# Patient Record
Sex: MALE | Race: OTHER
[De-identification: names, ages, dates, MRNs, and addresses within clinical notes are randomized per-mention and may not be internally consistent; named-entity substitution may affect disease eponyms.]

---

## 2017-08-29 ENCOUNTER — HOSPITAL ENCOUNTER (OUTPATIENT)
Dept: HOSPITAL 53 - M PAIN | Age: 67
End: 2017-08-29
Attending: NURSE PRACTITIONER
Payer: MEDICARE

## 2017-08-29 DIAGNOSIS — Z87.891: ICD-10-CM

## 2017-08-29 DIAGNOSIS — I10: ICD-10-CM

## 2017-08-29 DIAGNOSIS — E11.9: ICD-10-CM

## 2017-08-29 DIAGNOSIS — M96.1: ICD-10-CM

## 2017-08-29 DIAGNOSIS — Z79.84: ICD-10-CM

## 2017-08-29 DIAGNOSIS — I25.2: ICD-10-CM

## 2017-08-29 DIAGNOSIS — Z95.5: ICD-10-CM

## 2017-08-29 DIAGNOSIS — Z88.8: ICD-10-CM

## 2017-08-29 DIAGNOSIS — Z79.899: ICD-10-CM

## 2017-08-29 DIAGNOSIS — M51.26: Primary | ICD-10-CM

## 2017-08-29 DIAGNOSIS — E03.9: ICD-10-CM

## 2017-09-12 NOTE — ECWPNPC
PATIENT NAME: DAMIR GRACE

: 1950

GENDER: MALE

MRN: [id number]

VISIT DATE: 2017

DISCHARGE DATE: 17 1058

VISIT LOCKED DATE TIME: 

PHYSICIAN: ANISH DAVISON 

RESOURCE: ANISH DAVISON 

 

           

           

REASON FOR APPOINTMENT

           

          1. BACK

           

HISTORY OF PRESENT ILLNESS

           

      NEW PATIENT CONSULT:

      WHEN DID YOUR PAIN FIRST START?

          _____.

           

      BRIEFLY DESCRIBE HOW YOUR PAIN STARTED?

          ______.

           

      HOW DOES YOUR PAIN CHANGE WITH TIME?

          _______.

           

      DOES YOUR PAIN AWAKEN YOU FROM SLEEP?

          _____.

           

      HOW MANY HOURS OF SLEEP DO YOU NORMALLY GET?

          ______.

           

      ANY DIAGNOSTIC TESTING?

          _____.

           

      FACILITY WHERE TESTS WERE DONE?

          ____.

           

      PAIN TREATMENT

           

           

          TREATMENT YES

           

      CANCER

           

           

          HAVE YOU EVER HAD ANY TYPE OF CANCER?NO

           

           

          NO.

           

      PAIN SCREENING:

      PATIENT HAS A COMPLAINT OF ACUTE OR CHRONIC PAIN

           

           

          :YES

           

      FALL RISK SCREENING:

      SCREENING

           

           

          :NO FALLS IN THE PAST YEAR

           

      BECK INVENTORY:

      QUESTIONNAIRE

           

           

          ASSESSEDTBD

           

      SCORE

           

           

          VALUE CALCULATED TBD

           

      TODAY'S VISIT:

      NOTES:

          REFERRED BY EWA KINNEY PA-C [facility] NEUROSURGERY FOR LOW BACK PAIN. IS

          S/P LUMBAR FUSION L4 AND L5. COMPLETED IN  AND LUMBAR

          DISCECTOMY COMPLETED IN . SAW DR DELVALLE FOR CARDIOLOGY

          SECOND OPINION. DURING THE DISCUSSION AN MRI OF THE LUMBAR SPINE

          WAS ORDERED. PER PT A LARGE LUMBAR HERNIATED DISC WAS FOUND AND

          REFERRAL MADE TO DR SOLIS FOR A SURGICAL OPINION.AFTER

          SURGERY  PAIN WAS UNDER CONTROL UNTIL FALL . HAS HX OF A

          DRAINAGE FROM A DRAIN AREA IN THE LOW BACK. PAIN IS CENTERED

          ACROSS THE BACK TO HIPS. NOTES N/T IN LEFT LEG SINCE . NO

          SHOOTING PAIN TO EITHER LEG. IS NOTING SLEEP IS DISRUPTED BY

          PAIN. IS ALSO NOTING PAIN RADIATING UP SPINE RIGHT SIDE TO BASE

          OF NECK. IS NOTING EAKNESS IN BOTH LEGS CAN NO LONGER WALK USUAL

          1/2 MILE CIRCUIT WITHOUT SITTING DOWN..

           

CURRENT MEDICATIONS

           

          TAKING METFORMIN  MG TABLET 1 TABLET WITH MEALS ORALLY

          DAILY

          TAKING IRON (FERROUS GLUCONATE) 256 (28 FE) MG TABLET ORALLY

          TAKES 65 MG DAILY AT SUPPERTIME, NOTES: TAKES 65MG

          TAKING METOPROLOL TARTRATE 50 MG TABLET 1 TABLET WITH FOOD ORALLY

          DAILY

          TAKING VENLAFAXINE HCL  MG CAPSULE EXTENDED RELEASE 24 HOUR

          1 CAPSULE WITH FOOD ORALLY AT BEDTIME

          TAKING NITROGLYCERIN 0.4 MG TABLET SUBLINGUAL SUBLINGUAL AS

          NEEDED

          TAKING MELOXICAM 7.5 MG TABLET (PRIOR AUTH: RX REF#:[id number])

          ORAL DAILY

          TAKING NITROGLYCERIN 0.4 MG/HR PATCH 24 HOUR (PRIOR AUTH: RX

          REF#:[id number]) TRANSDERMAL DAILY

          TAKING GABAPENTIN 300 MG CAPSULE (PRIOR AUTH: RX

          REF#:[id number]) ORAL DAILY (TUE AND FRIDAY)

          TAKING ATORVASTATIN CALCIUM 20 MG TABLET (PRIOR AUTH: RX

          REF#:[id number]) ORAL DAILY

          TAKING PANTOPRAZOLE SODIUM 40 MG TABLET DELAYED RELEASE (PRIOR

          AUTH: RX REF#:[id number]) ORAL DAILY

          TAKING LISINOPRIL 20 MG TABLET (PRIOR AUTH: RX REF#:[id number])

          ORAL 2 IF NEEDED IN AM

          TAKING RANEXA 500 MG TABLET EXTENDED RELEASE 12 HOUR (PRIOR AUTH:

          RX REF#:[id number]) ORAL BID

          TAKING TOPROL XL 50 MG TABLET EXTENDED RELEASE 24 HOUR (PRIOR

          AUTH: RX REF#:[id number]) ORAL

          TAKING CLOPIDOGREL BISULFATE 75 MG TABLET (PRIOR AUTH: RX

          REF#:[id number]) ORAL DAILY

          TAKING ESOMEPRAZOLE MAGNESIUM 40 MG CAPSULE DELAYED RELEASE

          (PRIOR AUTH: RX REF#:[id number]) ORAL

          TAKING AMLODIPINE BESYLATE 5 MG TABLET (PRIOR AUTH: RX

          REF#:[id number]) ORAL DAILY

          TAKING ISOSORBIDE MONONITRATE ER 30 MG TABLET EXTENDED RELEASE 24

          HOUR (PRIOR AUTH: RX REF#:[id number]) ORAL DAILY

          TAKING SYNTHROID 100 MCG TABLET (PRIOR AUTH: RX

          REF#:[id number]) ORAL DAILY

          TAKING VENLAFAXINE HCL ER 37.5 MG CAPSULE EXTENDED RELEASE 24

          HOUR (PRIOR AUTH: RX REF#:[id number]) ORAL DAILY

          MEDICATION LIST REVIEWED AND RECONCILED WITH THE PATIENT

           

PAST MEDICAL HISTORY

           

          HTN

          DM

          MI WITH HX STENT PLACEMENT

          ATYPICAL CHEST PAIN

          HYPOTHYROIDISM

          LOW BACK PAIN WITH LEFT SIDED RADICULAR PAIN FOLLOWING LUMBAR

          SURGERY

           

ALLERGIES

           

          JACOB-SELTZER: CHEST PAIN

          CHLORPHENIRAMINE MALEATE: UNSURE

          PHENYLTOLOXAMINE-ACETAMINOPHEN: UNSURE

           

SURGICAL HISTORY

           

          L4-L5 DISC FUSION 

          DISC REMOVAL LOW BACK 2009

          CHOLECYSTECTOMY 

          RIGHT SIDED HERNIA REPAIR 

          LEFT SIDED HERNIA REPAIR 1980S

           

FAMILY HISTORY

           

          FATHER: 95 YRS, DIAGNOSED WITH HEART DISEASE

          MOTHER: 89 YRS, DIAGNOSED WITH DIABETES

           

SOCIAL HISTORY

           

          GENERAL:

           

          TOBACCO USE

          ARE YOU A:NONSMOKER QUIT SMOKING YEARS AGO

           

           

          ALCOHOL SCREENING

          DID YOU HAVE A DRINK CONTAINING ALCOHOL IN THE PAST YEAR?YES

          HOW OFTEN DID YOU HAVE A DRINK CONTAINING ALCOHOL IN THE PAST

          YEAR?MONTHLY OR LESS (1 POINT)

          POINTS1

          INTERPRETATIONNEGATIVE

           

           

          CAFFEINE

          CAFFEINE USE?YES

           

           

          DIET: DIABETIC.

           

           

          EXERCISE: DAILY, WALKS, OUTDOORS PERSON, KEEPS BUSY WHEN HE CAN

          HEART RATE CAN DROP DOWN TO 50S AND HE WILL SLEEP ALL DAY (MD

          SAYS PROBLEM WITH BLOOD FLOW)..

           

           

          MARITAL STATUS: .

           

           

          Confucianist

          [id number] Anglican NO Buddhism BELIEFS THAT WOULD IMPACT

          HEALTH CARE.

           

           

          LANGUAGE

          LANGUAGES SPOKEN:ENGLISH

           

           

          LEARNING BARRIERS / SPECIAL NEEDS

          HEARING IMPAIRED?YES

          :HEARING AIDES

          VISION IMPAIRED?YES

          :CORRECTIVE LENSES

          COGNITIVELY IMPAIRED?NO

          READINESS TO LEARN?YES

          LEARNING PREFERENCES?NO

          LEARNING CAPABILITIES PRESENT?YES

          SPECIAL DEVICES?NO

           NEEDED?NO

           

           

          PAIN CLINIC PFS, CLERGY, PUBLIC HEALTH REFERRALS

          PFS REFERRAL NEEDED?NO

          HAS THE PATIENT BEEN EDUCATED REGARDING HIS/HER PLAN OF CARE?YES

          HAS THE PATIENT BEEN EDUCATED REGARDING PAIN, THE RISK FOR PAIN,

          THE IMPORTANCE OF EFFECTIVE PAIN MANAGEMENT, AND THE PAIN

          ASSESSMENT PROCESS?YES

          CLERGY REFERRAL NEEDED?NO

          PUBLIC HEALTH REFERRAL NEEDED?NO

          WAS THE PROVIDER NOTIFIED OF ANY PERTINENT INFO?NO

           

           

          PATIENT: ____.

           

           

          ADVANCE DIRECTIVES

          HEALTH CARE PROXY?YES

          NAME OF HCP DAMIR GRACE (SON)PHONE # [phone]

          DO YOU HAVE A COPY WITH YOU?NO

          DO YOU HAVE A DNR?NO

          WOULD YOU LIKE MORE INFORMATION?NO

          LIVING WILL?YES

          DO YOU HAVE A COPY WITH YOU?NO

          POWER OF ?YES

          NAME OF POA? PHYIILS REEMA [phone]

          DO YOU HAVE A COPY WITH YOU?NO

          HAVE YOU HAD A COPY OF ANY ADVANCED DIRECTIVE (LISTED ABOVE) ON A

          PREVIOUS MEDICAL RECORDS AT [facility]?NO

           

HOSPITALIZATION/MAJOR DIAGNOSTIC PROCEDURE

           

          SEE ABOVE

          HAD CARDIAC CATH 1 WEEK AGO (AUG 17) WITH RESULTS NEGATIVE

           

REVIEW OF SYSTEMS

           

      REVIEWED BY:

           

          PROVIDER: _____ .

           

      CONSTITUTIONAL:

           

          ANY CHANGE IN YOUR MEDICAL CONDITION? NO . CHILLS NO . FEVER NO .

           

      INFECTION:

           

          DO YOU HAVE NEW INFECTIONS? NO . DO YOU HAVE HISTORY OF MRSA? NO

          .

           

      MUSCULOSKELETAL:

           

          ANY NEW PATTERNS OF PAIN OR NUMBNESS? NO . SYTEMIC LUPUS NO .

           

      GASTROENTEROLOGY:

           

          ANY NEW CHANGE IN BOWEL CONTROL? NO . BARRETTS ESOPHAGUS NO .

          CIRRHOSIS NO . HEPATITIS NO . LIVER FAILURE NO . ACID REFLUX YES

          . UNEXPLAINED WEIGHT LOSS NO .

           

      GENITOURINARY:

           

          ANY NEW CHANGE IN BLADDER CONTROL? NO . IS THERE A CHANCE YOU

          COULD BE PREGNANT? NO .

           

      HEMATOLOGY/LYMPH:

           

          DO YOU TAKE ANY BLOOD THINNERS? (FOR EXAMPLE- COUMADIN, PLAVIX,

          AGGRENOX, PLATEL, PRADAXA, OR XARELTO) YES . WHEN WAS YOUR LAST

          DOSE? DATE: TIME: . LOW PLATELET COUNT NO . SICKLE CELL DISEASE

          NO . VON WILLIEBRANDS NO . FACTOR V LEIDEN NO . THALLASEMIA NO .

          ANEMIA NO . EASY BRUISING NO .

           

      NEUROLOGY:

           

          HAVE YOU FALLEN IN THE PAST 6 MONTHS? NO . ANY NEW EXTREMITY

          NUMBNESS OR WEAKNESS? NO . HEAD INJURY NO . DEMENTIA NO .

          CEREBRAL PALSY NO . MULTIPLE SCLEROSIS NO . DIZZINESS NO .

          HEADACHE NO . STROKES HX TIA . VERTIGO NO .

           

      CARDIOLOGY:

           

          DO YOU HAVE A PACEMAKER OR DEFIBRILLATOR? NO . ANGINA NO . HEART

          ATTACK YES - 2014 - ON PLAVIX EVER SINCE . HEART SURGERY NO .

          CONGESTIVE HEART FAILURE/FLUID OVERLOAD NO . CHEST PAIN HX OF

          CHEST [place]RISTINA AND SPASMS. HAD CARDIAC CATH LAST WEEK - REPORTED AS

          ALL CLEAR. HAS HX OF INTERMITTANT BRADYCARDIA . HIGH BLOOD

          PRESSURE YES . IRREGULAR HEART BEAT YES, AT TIMES .

           

      RESPIRATORY:

           

          SLEEP APNEA NO TESTED . HAVE YOU BEEN SICK IN THE PAST WEEK? NO .

          FEVER NO . FLU LIKE SYMPTOMS? NO . CPAP NO . BYPAP NO . ASTHMA NO

          . EMPHYSEMA NO . CHRONIC LUNG DISEASES NO . SHORTNESS OF BREATH

          ON EXERTION YES . DO YOU USE ANY TYPE OF TOBACCO (SMOKE,

          SMOKELESS, CHEW)? NO . COUGH NO . SNORING NO .

           

      INTEGUMENTARY:

           

          DO YOU HAVE ANY RASHES OR OPEN SORES? NO .

           

      ALLERGIC/IMMUNO:

           

          ARE YOU ALLERGIC TO SHELLFISH OR IV DYE? NO . ANY NEW ALLERGIES?

          NO .

           

      PSYCHIATRIC:

           

          DO YOU HAVE THOUGHTS OF HURTING YOURSELF OR SOMEONE ELSE? NO .

          ARE YOU ABUSED, NEGLECTED, OR IN AN UNSAFE ENVIRONMENT? NO .

           

      ENDOCRINOLOGY:

           

          ARE YOU DIABETIC? YES - 79- 115 BS . THYROID DISORDER HYPOTHYROID

          - ON REPLACEMENT .

           

      OTHER:

           

          DO YOU NEED ANY PRESCRIPTIONS? NO . IF YES, PLEASE LIST: ____ .

          ANY NEW PROBLEMS WITH YOUR MEDICATIONS? NO . WHEN DID YOU LAST

          EAT? ____ . WHEN DID YOU LAST DRINK? ____ . WHAT DID YOU LAST

          DRINK? ____ . NAME OF PERSON DRIVING YOU HOME? ____ . DO YOU HAVE

          ANY OTHER QUESTIONS OR CONCERNS NO .

           

VITAL SIGNS

           

          .4 LBS, HT 5 FT 6 IN, BMI 32.50 INDEX, /69 MM HG, HR

          62 /MIN, RR 18 /MIN, OXYGEN SAT % 95, REVIEWED BY: NL.

           

EXAMINATION

           

      GENERAL EXAMINATION:

          GENERAL APPEARANCE:WELL GROOMED, OBESE.

           

          PSYCHAFFECT FLAT, ALERT , ORIENTED X 3 .

           

          HEENT:NORMOCEPHALIC, NO LYMPHADENOPATHY, NO THYROMEGLY.

           

          LUNGS:CLEAR TO AUSCULTATION BILATERALLY, NO WHEEZES, RALES OR

          RHONCHI.

           

          HEART:NO CAROTID BRUITS,, HEART RATE REGULAR, NORMAL S1S2,

          NORMAL.

           

          MUSCULOSKELETAL:MUSCLE STRENGTH TESTING 5/5 BILATERAL UPPER

          EXTREMITIES 4+ LEFTLE, 5- RLE. TENDER TO PALPATION OVER LUMBAR

          SPINOUS PROCESSES, BILATERAL SACRAL ILIAC JOINTS. HEALED SKIN

          LESION AT BASE OF INCISION , NO ERRYTHEMA, NO EXUDATE. CAN FLEX

          TO 30 DEGREES, EXTEND TO 10 DEGREES. SLR + ON RIGHT AT 20

          DEGREES. PAIN WITH PELVIC COMPRESSION AND PATTRICKS TESTING ON

          RIGHT. .

           

          JOINTS:RIGHT ANKLE, KNEES.

           

          NEUROLOGIC EXAM:SENS CHANGE RIGHT ANKLE, LATERAL LEFT THIGH.

          DTR'S TRACE UPPER AND LOWER EXTREMITIES. NO CLONUS. .

           

          DIAGNOSTIC TESTS REVIEWEDMRI OF LUMBAR SPINE WITH AND WITHOUT

          CONTRAST COMPLETED ON 17 DEMONSTATES LARGE DISC HERNIATION

          AT L3 WITH SEVERE IMPINGEMENT ON THE LEFT. ALSO NOTED IS A DISC

          HERNIATION AT L5 WITH MILD IMPINGEMENT. THE RADIALOGIST TRUE TAYLOR MD ALSO REPORTS THAT HE DID LOOK FOR REPORTED SINUS TRACT

          FROM PREVIOUS SURGERY - SLIGHT EDEMA IS NOTED IN THE LOWER LEFT

          ASPECT BUT NO DEFINITE TRACT IS SEEN. .

           

ASSESSMENTS

           

          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

           

TREATMENT

           

      LUMBAR DISC DISPLACEMENT WITHOUT MYELOPATHY

          NOTES: BILATERAL TRANSFORAMINAL EPIDURAL AT L3-4, L4-5 . NEED OK

          TO PUT PLAVIX ON HOLD FOR 7 DAYS. WITH PROCEDURE ON DAY 8 (DR DELVALLE AND DR BRADSHAW [facility] CARDIOLOGY) HOLD

          DIABETES MEDS AM OF PROCEDUREKEEP WALKING. MEDS AS PER PRIMARY

          DOCTOR.

           

PREVENTIVE MEDICINE

           

          REVIEWED PRE PROCEDURE CARE AND DISCUSSED PROCEDURE AND

          EXPECTATIONS/ PT EXPRESSED UNDERSTANDING OF ALL.

           

PROCEDURE CODES

           

           ESTABILISHED PATIENT [facility] FACILITY CHARGE

           

DISPOSITION & COMMUNICATION

           

FOLLOW UP

           

          AFTER INJECTION (REASON: CHECK AUTH FOR BILATERAL TRANFORAMINAL

          EPIDURAL/ GET OK TO STOP PLAVIX)

           

 

ELECTRONICALLY SIGNED BY TONYA BARKLEY ON

          2017 AT 03:53 PM EDT

           

           

           

 

DISCLAIMER :

THIS IS A VISIT SUMMARY EXTRACTED FROM THE [employer] CHART.

IT IS NOT A COPY OF THE [facility] PROGRESS NOTE.

RASHEED

## 2017-09-27 ENCOUNTER — HOSPITAL ENCOUNTER (OUTPATIENT)
Dept: HOSPITAL 53 - M PAIN | Age: 67
End: 2017-09-27
Attending: ANESTHESIOLOGY
Payer: MEDICARE

## 2017-09-27 DIAGNOSIS — Z79.84: ICD-10-CM

## 2017-09-27 DIAGNOSIS — I25.2: ICD-10-CM

## 2017-09-27 DIAGNOSIS — Z79.01: ICD-10-CM

## 2017-09-27 DIAGNOSIS — M51.16: ICD-10-CM

## 2017-09-27 DIAGNOSIS — G89.29: Primary | ICD-10-CM

## 2017-09-27 DIAGNOSIS — Z87.891: ICD-10-CM

## 2017-09-27 DIAGNOSIS — I10: ICD-10-CM

## 2017-09-27 DIAGNOSIS — Z79.899: ICD-10-CM

## 2017-09-27 DIAGNOSIS — Z88.8: ICD-10-CM

## 2017-09-27 DIAGNOSIS — M51.17: ICD-10-CM

## 2017-09-27 DIAGNOSIS — E11.9: ICD-10-CM

## 2017-09-27 DIAGNOSIS — E03.9: ICD-10-CM

## 2017-09-27 DIAGNOSIS — M96.1: Primary | ICD-10-CM

## 2017-09-27 PROCEDURE — 62323 NJX INTERLAMINAR LMBR/SAC: CPT

## 2017-09-28 NOTE — REP
FLUORO GUIDED SPINE INJECTION:

 

All imaging was reviewed with Dr. Garrett prior to dictation.

 

The portable C-arm is provided in the OR for Dr. Snyder for fluoroscopic

guidance.  Three intraoperative fluoroscopic spot films were obtained using last

image hold technology for needle placement verification for this fluoroscopic

guidance spine injection.  The films are on the PACS system and are available for

review.

 

15 seconds of fluoroscopy time were utilized for this procedure.

 

 

Reviewed by

ABDIRIZAK Cruz 09/28/2017 09:45 AEdited and Signed by

Yuri Garrett MD 09/28/2017 08:00 P

## 2017-10-09 NOTE — ECWPNPC
PATIENT NAME: DAMIR GRACE

: 1950

GENDER: MALE

MRN: [id number]

VISIT DATE: 2017

DISCHARGE DATE: 17 1307

VISIT LOCKED DATE TIME: 

PHYSICIAN: MEDINA PARKER  

RESOURCE: MEDINA PARKER  

 

           

           

REASON FOR APPOINTMENT

           

          1. LOW BACK PAIN

           

HISTORY OF PRESENT ILLNESS

           

      HISTORY OF PRESENT ILLNESS:

      PAIN

           

           

          THE PATIENT DESCRIBES THE PAIN...

           

           67 YEAR OLD MALE PATIENT WITH HISTORY OF CHRONIC LOW BACK PAIN.

          PATIENT DESCRIBES THE PAIN AS TENDER AND SORE AND HAVING IT ALL

          THE TIME WITH A PAIN SCORE OF 6/10. PATIENT RECEIVED A CAUDAL

          EPIDURAL TODAY AND REPORTS THE PAIN IS STILL PRESENT AT THIS

          TIME. PATIENT STATES THAT HIS PAIN GOES DOWN HIS LEGS AND MAKES

          IT DIFFICULT TO DO EVERYDAY THINGS. PATIENT DENIES UNEXPLAINABLE

          WEIGHT LOSS, FEVER, CHILLS, NEW CHANGES ON HIS URINARY OR BOWEL

          CONTROL.

           

      FALL RISK SCREENING:

      SCREENING

           

           

          :NO FALLS IN THE PAST YEAR

           

CURRENT MEDICATIONS

           

          TAKING METFORMIN  MG TABLET 1 TABLET WITH MEALS ORALLY

          DAILY

          TAKING IRON (FERROUS GLUCONATE) 256 (28 FE) MG TABLET ORALLY

          TAKES 65 MG DAILY AT SUPPERTIME

          TAKING METOPROLOL TARTRATE 50 MG TABLET 1 TABLET WITH FOOD ORALLY

          DAILY

          TAKING VENLAFAXINE HCL  MG CAPSULE EXTENDED RELEASE 24 HOUR

          1 CAPSULE WITH FOOD ORALLY AT BEDTIME, NOTES: RAN OUT AND HASN'T

          RECEIVED IT YET

          TAKING NITROGLYCERIN 0.4 MG TABLET SUBLINGUAL SUBLINGUAL AS

          NEEDED

          TAKING MELOXICAM 7.5 MG TABLET (PRIOR AUTH: RX REF#:[id number])

          ORAL DAILY

          TAKING NITROGLYCERIN 0.4 MG/HR PATCH 24 HOUR (PRIOR AUTH: RX

          REF#:[id number]) TRANSDERMAL DAILY

          TAKING GABAPENTIN 300 MG CAPSULE (PRIOR AUTH: RX

          REF#:[id number]) ORAL DAILY (TUE AND FRIDAY)

          TAKING ATORVASTATIN CALCIUM 20 MG TABLET (PRIOR AUTH: RX

          REF#:[id number]) ORAL DAILY

          TAKING PANTOPRAZOLE SODIUM 40 MG TABLET DELAYED RELEASE (PRIOR

          AUTH: RX REF#:[id number]) ORAL DAILY

          TAKING LISINOPRIL 20 MG TABLET (PRIOR AUTH: RX REF#:[id number])

          ORAL 2 IF NEEDED IN AM

          TAKING RANEXA 500 MG TABLET EXTENDED RELEASE 12 HOUR (PRIOR AUTH:

          RX REF#:[id number]) ORAL BID

          TAKING TOPROL XL 50 MG TABLET EXTENDED RELEASE 24 HOUR (PRIOR

          AUTH: RX REF#:[id number]) ORAL

          TAKING CLOPIDOGREL BISULFATE 75 MG TABLET (PRIOR AUTH: RX

          REF#:[id number]) ORAL DAILY

          TAKING ESOMEPRAZOLE MAGNESIUM 40 MG CAPSULE DELAYED RELEASE

          (PRIOR AUTH: RX REF#:[id number]) ORAL

          TAKING AMLODIPINE BESYLATE 5 MG TABLET (PRIOR AUTH: RX

          REF#:[id number]) ORAL DAILY

          TAKING ISOSORBIDE MONONITRATE ER 30 MG TABLET EXTENDED RELEASE 24

          HOUR (PRIOR AUTH: RX REF#:[id number]) ORAL DAILY

          TAKING SYNTHROID 100 MCG TABLET (PRIOR AUTH: RX

          REF#:[id number]) ORAL DAILY

          TAKING VENLAFAXINE HCL ER 37.5 MG CAPSULE EXTENDED RELEASE 24

          HOUR (PRIOR AUTH: RX REF#:[id number]) ORAL DAILY

          MEDICATION LIST REVIEWED AND RECONCILED WITH THE PATIENT

           

PAST MEDICAL HISTORY

           

          HTN

          DM

          MI WITH HX STENT PLACEMENT

          ATYPICAL CHEST PAIN

          HYPOTHYROIDISM

          LOW BACK PAIN WITH LEFT SIDED RADICULAR PAIN FOLLOWING LUMBAR

          SURGERY

           

ALLERGIES

           

          JACOB-SELTZER: CHEST PAIN

          CHLORPHENIRAMINE MALEATE: UNSURE

          PHENYLTOLOXAMINE-ACETAMINOPHEN: UNSURE

           

SOCIAL HISTORY

           

          GENERAL:

           

          TOBACCO USE

          ARE YOU A:NONSMOKER QUIT SMOKING YEARS AGO

           

           

          ALCOHOL SCREENING

          DID YOU HAVE A DRINK CONTAINING ALCOHOL IN THE PAST YEAR?YES

          POINTS1

          INTERPRETATIONNEGATIVE

          HOW OFTEN DID YOU HAVE A DRINK CONTAINING ALCOHOL IN THE PAST

          YEAR?MONTHLY OR LESS (1 POINT)

           

           

          CAFFEINE

          CAFFEINE USE?YES

           

           

          DIET: DIABETIC.

           

           

          EXERCISE: DAILY, WALKS, OUTDOORS PERSON, KEEPS BUSY WHEN HE CAN

          HEART RATE CAN DROP DOWN TO 50S AND HE WILL SLEEP ALL DAY (MD

          SAYS PROBLEM WITH BLOOD FLOW)..

           

           

          MARITAL STATUS: .

           

           

          Adventism

          [id number] [employer] NO Adventism BELIEFS THAT WOULD IMPACT

          HEALTH CARE.

           

           

          LANGUAGE

          LANGUAGES SPOKEN:ENGLISH

           

           

          LEARNING BARRIERS / SPECIAL NEEDS

          CHANGE FROM LAST VISIT?NO

          BARRIERS TO LEARNING?NO

          HEARING IMPAIRED?YES

          :HEARING AIDES

          VISION IMPAIRED?YES

          :CORRECTIVE LENSES

          COGNITIVELY IMPAIRED?NO

          READINESS TO LEARN?YES

          LEARNING PREFERENCES?NO

          LEARNING CAPABILITIES PRESENT?YES

          EMOTIONAL BARRIERS?NO

          SPECIAL DEVICES?NO

           NEEDED?NO

           

           

          PAIN CLINIC PFS, CLERGY, PUBLIC HEALTH REFERRALS

          PFS REFERRAL NEEDED?NO

          CLERGY REFERRAL NEEDED?NO

          PUBLIC HEALTH REFERRAL NEEDED?NO

          WAS THE PROVIDER NOTIFIED OF ANY PERTINENT INFO?NO

          HAS THE PATIENT BEEN EDUCATED REGARDING HIS/HER PLAN OF CARE?YES

          HAS THE PATIENT BEEN EDUCATED REGARDING PAIN, THE RISK FOR PAIN,

          THE IMPORTANCE OF EFFECTIVE PAIN MANAGEMENT, AND THE PAIN

          ASSESSMENT PROCESS?YES

           

           

          PATIENT: ____.

           

           

          ADVANCE DIRECTIVES

          HEALTH CARE PROXY?YES

          NAME OF HCP DAMIR GRACE (SON)PHONE # [phone]

          DO YOU HAVE A COPY WITH YOU?NO

          DO YOU HAVE A DNR?NO

          WOULD YOU LIKE MORE INFORMATION?NO

          LIVING WILL?YES

          DO YOU HAVE A COPY WITH YOU?NO

          POWER OF ?YES

          NAME OF POA? TAYLOR [phone]

          DO YOU HAVE A COPY WITH YOU?NO

          HAVE YOU HAD A COPY OF ANY ADVANCED DIRECTIVE (LISTED ABOVE) ON A

          PREVIOUS MEDICAL RECORDS AT [facility]?NO

           

REVIEW OF SYSTEMS

           

      REVIEWED BY:

           

          PROVIDER:    MEDINA PARKER MD .

           

      CONSTITUTIONAL:

           

          ANY CHANGE IN YOUR MEDICAL CONDITION?    NO . CHILLS    NO .

          FEVER    NO .

           

      INFECTION:

           

          DO YOU HAVE NEW INFECTIONS?    NO . DO YOU HAVE HISTORY OF MRSA? 

            NO .

           

      MUSCULOSKELETAL:

           

          ANY NEW PATTERNS OF PAIN OR NUMBNESS?    NO .

           

      GASTROENTEROLOGY:

           

          ANY NEW CHANGE IN BOWEL CONTROL?    NO .

           

      GENITOURINARY:

           

          ANY NEW CHANGE IN BLADDER CONTROL?    NO . IS THERE A CHANCE YOU

          COULD BE PREGNANT?    NO .

           

      HEMATOLOGY/LYMPH:

           

          DO YOU TAKE ANY BLOOD THINNERS? (FOR EXAMPLE- COUMADIN, PLAVIX,

          AGGRENOX, PLATEL, PRADAXA, OR XARELTO)    YES, PLAVIX . WHEN WAS

          YOUR LAST DOSE?    DATE:17 TIME: 0800 .

           

      NEUROLOGY:

           

          HAVE YOU FALLEN IN THE PAST 6 MONTHS?    NO . ANY NEW EXTREMITY

          NUMBNESS OR WEAKNESS?    NO .

           

      CARDIOLOGY:

           

          DO YOU HAVE A PACEMAKER OR DEFIBRILLATOR?    NO .

           

      RESPIRATORY:

           

          HAVE YOU BEEN SICK IN THE PAST WEEK?    NO . FEVER    NO . FLU

          LIKE SYMPTOMS?    NO . COUGH    NO .

           

      INTEGUMENTARY:

           

          DO YOU HAVE ANY RASHES OR OPEN SORES?    NO .

           

      ALLERGIC/IMMUNO:

           

          ARE YOU ALLERGIC TO SHELLFISH OR IV DYE?    NO . ANY NEW

          ALLERGIES?    NO .

           

      PSYCHIATRIC:

           

          DO YOU HAVE THOUGHTS OF HURTING YOURSELF OR SOMEONE ELSE?    NO .

          ARE YOU ABUSED, NEGLECTED, OR IN AN UNSAFE ENVIRONMENT?    NO .

           

      ENDOCRINOLOGY:

           

          ARE YOU DIABETIC?    YES .

           

      OTHER:

           

          DO YOU NEED ANY PRESCRIPTIONS?    NO . IF YES, PLEASE LIST:   

          ____ . ANY NEW PROBLEMS WITH YOUR MEDICATIONS?    NO . WHEN DID

          YOU LAST EAT?    ____ . WHEN DID YOU LAST DRINK?    ____ . WHAT

          DID YOU LAST DRINK?    ____ . NAME OF PERSON DRIVING YOU HOME?   

          ____ . DO YOU HAVE ANY OTHER QUESTIONS OR CONCERNS    NO, NOT AT

          PRESENT TIME .

           

VITAL SIGNS

           

          .8 LBS, HT 5 FT 6 IN, BMI 32.08 INDEX, /78 MM HG, HR

          56 /MIN, RR 18 /MIN, TEMP 97.5 F, OXYGEN SAT % 96%, NA INITIALS

          MP 1156, REVIEWED BY: CS.

           

EXAMINATION

           

       : PATIENT IS ALERT O X 3 AND COOPERATIVE.

          TENDERNESS IN THE LOWER BACK AND PARASPINAL MUSCLE GROUP. MRI

          DONE ON 17 OF THE LUMBAR SPINE SHOWS DISC BULGE AT L3-L4.

          PATIENT LIMPING FROM LEFT LEG. LEFT LEG IS WEAKER THEN THE RIGHT

          AT EXTENSION AND FLEXION. SCAR FROM BACK SURGERY OPEN BUT DRY,

          PATIENT REPORTS NOT FLUID LEAKING FROM IT.

           

ASSESSMENTS

           

          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

          (PRIMARY)

           

          INTERVERTEBRAL DISC DISORDER WITH RADICULOPATHY OF LUMBAR REGION

          - M51.16

           

          UNRESOLVED ISSUES WITH MRI DONE ON 2017.

           

TREATMENT

           

      POSTLAMINECTOMY SYNDROME, NOT ELSEWHERE CLASSIFIED

          NOTES: WE DISCUSSED SEVERAL ISSUES WITH MR. GRACE'S PAIN

          MANAGEMENT CASE. AT THIS TIME THE PATIENT WILL CONTINUE WITH THE

          SAME MEDICATION REGIME AS BEFORE. I WOULD LIKE THE PATIENT TO

          RECEIVE AN UPDATED MRI DUE TO THE SINUS TRACT. I WOULD ALSO LIKE

          THE PATIENT TO HAVE A NEUROSURGICAL CONSULT. PATIENT WILL START

          USING PLAVIX AGAIN TOMORROW. PATIENT WILL RETURN TO THE CLINIC IN

          4 WEEKS TO DISCUSS WHAT DR. RADHA KRISHNAMURTHY HAD STATES AS WELL AS

          RECEIVING THE MRI. INSTRUCTIONS WERE GIVEN, QUESTIONS WERE

          ANSWERED, PATIENT REPORTS UNDERSTANDING AND AGREES WITH THE PLAN.

          I, BRISSA MANDUJANO, DOCUMENTED THE ABOVE INFORMATION ACTING AS A

          SCRIBE FOR DR. PARKER. I HAVE REVIEWED THE ABOVE DOCUMENT,

          WRITTEN BY BRISSA BREWER AND I VERIFY THAT IT IS

          ACCURATE.

           

PROCEDURE CODES

           

           ESTABILISHED PATIENT [facility] FACILITY CHARGE

           

           DOC MEDS VERIFIED W/PT OR RE

           

           PAIN ASSESS POS TOOL F/U PLAN DOC

           

DISPOSITION & COMMUNICATION

           

FOLLOW UP

           

          3 WEEKS

           

 

ELECTRONICALLY SIGNED BY MEDINA PARKER MD ON

          10/09/2017 AT 05:02 PM EDT

           

           

           

 

DISCLAIMER :

THIS IS A VISIT SUMMARY EXTRACTED FROM THE [employer] CHART.

IT IS NOT A COPY OF THE [employer] PROGRESS NOTE.

MTDD

## 2017-10-10 ENCOUNTER — HOSPITAL ENCOUNTER (OUTPATIENT)
Dept: HOSPITAL 53 - M PAIN | Age: 67
End: 2017-10-10
Attending: NURSE PRACTITIONER
Payer: MEDICARE

## 2017-10-10 DIAGNOSIS — Z88.8: ICD-10-CM

## 2017-10-10 DIAGNOSIS — E03.9: ICD-10-CM

## 2017-10-10 DIAGNOSIS — Z95.5: ICD-10-CM

## 2017-10-10 DIAGNOSIS — I10: ICD-10-CM

## 2017-10-10 DIAGNOSIS — M51.16: ICD-10-CM

## 2017-10-10 DIAGNOSIS — E11.9: ICD-10-CM

## 2017-10-10 DIAGNOSIS — Z98.1: ICD-10-CM

## 2017-10-10 DIAGNOSIS — Z79.84: ICD-10-CM

## 2017-10-10 DIAGNOSIS — Z79.899: ICD-10-CM

## 2017-10-10 DIAGNOSIS — M96.1: ICD-10-CM

## 2017-10-10 DIAGNOSIS — G89.29: Primary | ICD-10-CM

## 2017-10-10 DIAGNOSIS — I25.2: ICD-10-CM

## 2017-11-02 NOTE — ECWPNPC
PATIENT NAME: DAMIR GRACE

: 1950

GENDER: MALE

MRN: [id number]

VISIT DATE: 10/10/2017

DISCHARGE DATE: 10/10/17 1550

VISIT LOCKED DATE TIME: 

PHYSICIAN: ANISH DAVISON 

RESOURCE: ANISH DAVISON 

 

           

           

REASON FOR APPOINTMENT

           

          1. POST CAUDAL EP

           

HISTORY OF PRESENT ILLNESS

           

      HISTORY OF PRESENT ILLNESS:

      PAIN

           

           

          THE PATIENT DESCRIBES THE PAIN...

           

      FALL RISK SCREENING:

      SCREENING

           

           

          :NO FALLS IN THE PAST YEAR

           

      TODAY'S VISIT:

      NOTES:

          PT IS S/P CAUDAL EPIDURAL COMPLETED ON 17. IS REPORTING A

          SIGNIFICANT DECREASE IN BACK AND LEG PAIN. REPORTS NO ADVRESE

          EFFECTS FROM INJECTION TREATMENT.

           

CURRENT MEDICATIONS

           

          TAKING METFORMIN  MG TABLET 1 TABLET WITH MEALS ORALLY

          DAILY

          TAKING IRON (FERROUS GLUCONATE) 256 (28 FE) MG TABLET ORALLY

          TAKES 65 MG DAILY AT SUPPERTIME

          TAKING METOPROLOL TARTRATE 50 MG TABLET 1 TABLET WITH FOOD ORALLY

          DAILY

          TAKING VENLAFAXINE HCL  MG CAPSULE EXTENDED RELEASE 24 HOUR

          1 CAPSULE WITH FOOD ORALLY AT BEDTIME

          TAKING NITROGLYCERIN 0.4 MG TABLET SUBLINGUAL SUBLINGUAL AS

          NEEDED

          TAKING MELOXICAM 7.5 MG TABLET (PRIOR AUTH: RX REF#:[id number])

          ORAL DAILY

          TAKING NITROGLYCERIN 0.4 MG/HR PATCH 24 HOUR (PRIOR AUTH: RX

          REF#:[id number]) TRANSDERMAL DAILY

          TAKING GABAPENTIN 300 MG CAPSULE (PRIOR AUTH: RX

          REF#:[id number]) ORAL DAILY (TUE AND FRIDAY)

          TAKING ATORVASTATIN CALCIUM 20 MG TABLET (PRIOR AUTH: RX

          REF#:[id number]) ORAL DAILY

          TAKING PANTOPRAZOLE SODIUM 40 MG TABLET DELAYED RELEASE (PRIOR

          AUTH: RX REF#:[id number]) ORAL DAILY

          TAKING LISINOPRIL 20 MG TABLET (PRIOR AUTH: RX REF#:[id number])

          ORAL 2 IF NEEDED IN AM

          TAKING RANEXA 500 MG TABLET EXTENDED RELEASE 12 HOUR (PRIOR AUTH:

          RX REF#:[id number]) ORAL BID

          TAKING TOPROL XL 50 MG TABLET EXTENDED RELEASE 24 HOUR (PRIOR

          AUTH: RX REF#:[id number]) ORAL

          TAKING CLOPIDOGREL BISULFATE 75 MG TABLET (PRIOR AUTH: RX

          REF#:[id number]) ORAL DAILY

          TAKING ESOMEPRAZOLE MAGNESIUM 40 MG CAPSULE DELAYED RELEASE

          (PRIOR AUTH: RX REF#:[id number]) ORAL

          TAKING AMLODIPINE BESYLATE 5 MG TABLET (PRIOR AUTH: RX

          REF#:[id number]) ORAL DAILY

          TAKING ISOSORBIDE MONONITRATE ER 30 MG TABLET EXTENDED RELEASE 24

          HOUR (PRIOR AUTH: RX REF#:[id number]) ORAL DAILY

          TAKING SYNTHROID 100 MCG TABLET (PRIOR AUTH: RX

          REF#:[id number]) ORAL DAILY

          TAKING VENLAFAXINE HCL ER 37.5 MG CAPSULE EXTENDED RELEASE 24

          HOUR (PRIOR AUTH: RX REF#:[id number]) ORAL DAILY

          MEDICATION LIST REVIEWED AND RECONCILED WITH THE PATIENT

           

PAST MEDICAL HISTORY

           

          HTN

          DM

          MI WITH HX STENT PLACEMENT

          ATYPICAL CHEST PAIN

          HYPOTHYROIDISM

          LOW BACK PAIN WITH LEFT SIDED RADICULAR PAIN FOLLOWING LUMBAR

          SURGERY

           

ALLERGIES

           

          JACOB-SELTZER: CHEST PAIN

          CHLORPHENIRAMINE MALEATE: UNSURE

          PHENYLTOLOXAMINE-ACETAMINOPHEN: UNSURE

           

SURGICAL HISTORY

           

          L4-L5 DISC FUSION 

          DISC REMOVAL LOW BACK 2009

          CHOLECYSTECTOMY 

          RIGHT SIDED HERNIA REPAIR 

          LEFT SIDED HERNIA REPAIR 1980S

           

HOSPITALIZATION/MAJOR DIAGNOSTIC PROCEDURE

           

          SEE ABOVE

          HAD CARDIAC CATH 1 WEEK AGO (AUG 17) WITH RESULTS NEGATIVE

           

REVIEW OF SYSTEMS

           

      REVIEWED BY:

           

          PROVIDER:    ANISH SOLIS .

           

      CONSTITUTIONAL:

           

          ANY CHANGE IN YOUR MEDICAL CONDITION?    NO . CHILLS    NO .

          FEVER    NO .

           

      INFECTION:

           

          DO YOU HAVE NEW INFECTIONS?    NO . DO YOU HAVE HISTORY OF MRSA? 

            NO .

           

      MUSCULOSKELETAL:

           

          ANY NEW PATTERNS OF PAIN OR NUMBNESS?    NO .

           

      GASTROENTEROLOGY:

           

          ANY NEW CHANGE IN BOWEL CONTROL?    NO .

           

      GENITOURINARY:

           

          ANY NEW CHANGE IN BLADDER CONTROL?    NO . IS THERE A CHANCE YOU

          COULD BE PREGNANT?    NO .

           

      HEMATOLOGY/LYMPH:

           

          DO YOU TAKE ANY BLOOD THINNERS? (FOR EXAMPLE- COUMADIN, PLAVIX,

          AGGRENOX, PLATEL, PRADAXA, OR XARELTO)    PLAVIX . WHEN WAS YOUR

          LAST DOSE?    DATE: TIME:  .

           

      NEUROLOGY:

           

          HAVE YOU FALLEN IN THE PAST 6 MONTHS?    NO . ANY NEW EXTREMITY

          NUMBNESS OR WEAKNESS?    NO .

           

      CARDIOLOGY:

           

          DO YOU HAVE A PACEMAKER OR DEFIBRILLATOR?    NO .

           

      RESPIRATORY:

           

          HAVE YOU BEEN SICK IN THE PAST WEEK?    NO . FEVER    NO . FLU

          LIKE SYMPTOMS?    NO . COUGH    NO .

           

      INTEGUMENTARY:

           

          DO YOU HAVE ANY RASHES OR OPEN SORES?    NO .

           

      ALLERGIC/IMMUNO:

           

          ARE YOU ALLERGIC TO SHELLFISH OR IV DYE?    NO . ANY NEW

          ALLERGIES?    NO .

           

      PSYCHIATRIC:

           

          DO YOU HAVE THOUGHTS OF HURTING YOURSELF OR SOMEONE ELSE?    NO .

          ARE YOU ABUSED, NEGLECTED, OR IN AN UNSAFE ENVIRONMENT?    NO .

           

      ENDOCRINOLOGY:

           

          ARE YOU DIABETIC?    YES .

           

      OTHER:

           

          DO YOU NEED ANY PRESCRIPTIONS?    NO . IF YES, PLEASE LIST:   

          ____ . ANY NEW PROBLEMS WITH YOUR MEDICATIONS?    NO . WHEN DID

          YOU LAST EAT?    ____ . WHEN DID YOU LAST DRINK?    ____ . WHAT

          DID YOU LAST DRINK?    ____ . NAME OF PERSON DRIVING YOU HOME?   

          ____ . DO YOU HAVE ANY OTHER QUESTIONS OR CONCERNS    WOULD LIKE

          TO KNOW MRI RESULTS (PAPERS ATTACHED) .

           

VITAL SIGNS

           

          .2 LBS, HT 5 FT 6 IN, BMI 31.83 INDEX, /84 MM HG, HR

          98 /MIN, RR 18 /MIN, TEMP 98.6 F, OXYGEN SAT % 95%, NA INITIALS

          SC 14:37, REVIEWED BY: NL.

           

EXAMINATION

           

      GENERAL EXAMINATION:

          PSYCHALERT , ORIENTED X 3 , APPROPRIATE MOOD AND AFFECT .

           

          LUNGS:CLEAR TO AUSCULTATION BILATERALLY.

           

          MUSCULOSKELETAL:POINT TENDERNESS OVER LUMBAR SPINOUS PROCESSES.

           

          DIAGNOSTIC TESTS REVIEWEDMRI OF LUMBAR SPINE COMPLETED ON

          10/2/17. THERE IS MODERATE TO SEVERE GEGENERATIVE DISC DISEASE AT

          L3-4 AND L5-S1. . THERE IS MILD TO SEVERE LEFT CENTRAL DISC

          EXTRUSION.WHERE IT EXHIBITS TO SUPERIOR AND INFERIOR MIGRATION.

          THIS IS PRODUCING SEVERE STENOSIS ONTHE LEFT LATERAL RECESS. SCAR

          TISSUE CAN NOT BE EXCLUDED. THERE IS RIGHT FACET ARTHROPATHYTHERE

          IS MILD RIGHT SUBARTICULAR DISC PROTRUSION AT L4-5. THERE IS MILD

          POSTERIOR RIGHT DISC EXTRUSION AT L5-S1. THERE IS INFERIOR

          MIGRATION .

           

ASSESSMENTS

           

          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

          (PRIMARY)

           

          INTERVERTEBRAL DISC DISORDER WITH RADICULOPATHY OF LUMBAR REGION

          - M51.16

           

          UNRESOLVED ISSUES WITH MRI DONE ON 2017.

           

TREATMENT

           

      POSTLAMINECTOMY SYNDROME, NOT ELSEWHERE CLASSIFIED

          STOP MELOXICAM TABLET, 7.5 MG, (PRIOR AUTH: RX

          REF#:[id number]), ORAL, DAILY

          STOP GABAPENTIN CAPSULE, 300 MG, (PRIOR AUTH: RX

          REF#:[id number]), ORAL, DAILY (TUE AND FRIDAY)

          STOP ESOMEPRAZOLE MAGNESIUM CAPSULE DELAYED RELEASE, 40 MG,

          (PRIOR AUTH: RX REF#:[id number]), ORAL

          START NORCO TABLET, 5-325 MG, 1 TABLET AS NEEDED, ORALLY, TAKE 1

          DAILY IF NEEDED FOR PAIN MDD=1, 30 DAY(S), 30, REFILLS 0

          NOTES: NARCOTIC AGREEMENT TODAY. CALL IF NEED TO DO BACK

          INJECTION BEFORE NEXT VISIT.

           

PROCEDURE CODES

           

           ESTABIL[facility]

           

DISPOSITION & COMMUNICATION

           

FOLLOW UP

           

          CANCEL 10/27 -  (REASON: BACK PAIN)

           

 

ELECTRONICALLY SIGNED BY TONYA BARKLEY ON

          2017 AT 07:05 PM EDT

           

           

           

 

DISCLAIMER :

THIS IS A VISIT SUMMARY EXTRACTED FROM THE [employer] CHART.

IT IS NOT A COPY OF THE [employer] PROGRESS NOTE.

RASHEED

## 2017-11-13 ENCOUNTER — HOSPITAL ENCOUNTER (OUTPATIENT)
Dept: HOSPITAL 53 - M PAIN | Age: 67
End: 2017-11-13
Attending: NURSE PRACTITIONER
Payer: MEDICARE

## 2017-11-13 DIAGNOSIS — I10: ICD-10-CM

## 2017-11-13 DIAGNOSIS — Z79.891: ICD-10-CM

## 2017-11-13 DIAGNOSIS — Z88.8: ICD-10-CM

## 2017-11-13 DIAGNOSIS — E03.9: ICD-10-CM

## 2017-11-13 DIAGNOSIS — G89.29: Primary | ICD-10-CM

## 2017-11-13 DIAGNOSIS — Z79.899: ICD-10-CM

## 2017-11-13 DIAGNOSIS — I25.2: ICD-10-CM

## 2017-11-13 DIAGNOSIS — Z87.891: ICD-10-CM

## 2017-11-13 DIAGNOSIS — Z95.5: ICD-10-CM

## 2017-11-13 DIAGNOSIS — M96.1: ICD-10-CM

## 2017-11-13 DIAGNOSIS — Z79.84: ICD-10-CM

## 2017-11-13 DIAGNOSIS — E11.9: ICD-10-CM

## 2017-11-13 DIAGNOSIS — M51.16: ICD-10-CM

## 2017-11-13 DIAGNOSIS — Z98.1: ICD-10-CM

## 2017-11-30 NOTE — ECWPNPC
PATIENT NAME: DAMIR GRACE

: 1950

GENDER: MALE

MRN: [id number]

VISIT DATE: 2017

DISCHARGE DATE: 17 1445

VISIT LOCKED DATE TIME: 

PHYSICIAN: ANISH DAVISON 

RESOURCE: ANISH DAVISON 

 

           

           

REASON FOR APPOINTMENT

           

          1. BACK

           

HISTORY OF PRESENT ILLNESS

           

      HISTORY OF PRESENT ILLNESS:

      PAIN

           

           

          THE PATIENT DESCRIBES THE PAIN...

           

      FALL RISK SCREENING:

      SCREENING

           

           

          :NO FALLS IN THE PAST YEAR

           

      TODAY'S VISIT:

      NOTES:

          RATES PAIN TODAY AS 8/10. DESCRIBES PAIN AS CONSTANT, ACHING

          TENDER AND SORE. PAIN CENTERED AT LOW BACK AND PAIN RADIATES UP

          THE SPINE. HAS USED PAIN MEDS SPARINGLY AND THIS HAS BEEN

          EFFECTIVE. HAS ARTHRITIS IN KNEES AND WAS INJECTED IN LEFT KNEE -

          NO IMPROVEMENT NOTED..

           

CURRENT MEDICATIONS

           

          TAKING METFORMIN  MG TABLET 1 TABLET WITH MEALS ORALLY

          DAILY

          TAKING IRON (FERROUS GLUCONATE) 256 (28 FE) MG TABLET ORALLY

          TAKES 65 MG DAILY AT SUPPERTIME

          TAKING METOPROLOL TARTRATE 50 MG TABLET 1 TABLET WITH FOOD ORALLY

          DAILY

          TAKING VENLAFAXINE HCL  MG CAPSULE EXTENDED RELEASE 24 HOUR

          1 CAPSULE WITH FOOD ORALLY AT BEDTIME

          TAKING NITROGLYCERIN 0.4 MG TABLET SUBLINGUAL SUBLINGUAL AS

          NEEDED

          TAKING NITROGLYCERIN 0.4 MG/HR PATCH 24 HOUR (PRIOR AUTH: RX

          REF#:[id number]) TRANSDERMAL DAILY

          TAKING ATORVASTATIN CALCIUM 20 MG TABLET (PRIOR AUTH: RX

          REF#:[id number]) ORAL DAILY

          TAKING PANTOPRAZOLE SODIUM 40 MG TABLET DELAYED RELEASE (PRIOR

          AUTH: RX REF#:[id number]) ORAL DAILY

          TAKING LISINOPRIL 20 MG TABLET (PRIOR AUTH: RX REF#:[id number])

          ORAL 2 IF NEEDED IN AM

          TAKING TOPROL XL 50 MG TABLET EXTENDED RELEASE 24 HOUR (PRIOR

          AUTH: RX REF#:[id number]) ORAL

          TAKING CLOPIDOGREL BISULFATE 75 MG TABLET (PRIOR AUTH: RX

          REF#:[id number]) ORAL DAILY

          TAKING AMLODIPINE BESYLATE 5 MG TABLET (PRIOR AUTH: RX

          REF#:[id number]) ORAL DAILY

          TAKING ISOSORBIDE MONONITRATE ER 30 MG TABLET EXTENDED RELEASE 24

          HOUR (PRIOR AUTH: RX REF#:[id number]) ORAL DAILY

          TAKING SYNTHROID 100 MCG TABLET (PRIOR AUTH: RX

          REF#:[id number]) ORAL DAILY

          TAKING VENLAFAXINE HCL ER 37.5 MG CAPSULE EXTENDED RELEASE 24

          HOUR (PRIOR AUTH: RX REF#:[id number]) ORAL DAILY

          TAKING NORCO 5-325 MG TABLET 1 TABLET AS NEEDED ORALLY TAKE 1

          DAILY IF NEEDED FOR PAIN MDD=1

          NOT-TAKING RANEXA 500 MG TABLET EXTENDED RELEASE 12 HOUR (PRIOR

          AUTH: RX REF#:[id number]) ORAL BID, NOTES: STOPPED 

          MEDICATION LIST REVIEWED AND RECONCILED WITH THE PATIENT

           

PAST MEDICAL HISTORY

           

          HTN

          DM

          MI WITH HX STENT PLACEMENT

          ATYPICAL CHEST PAIN

          HYPOTHYROIDISM

          LOW BACK PAIN WITH LEFT SIDED RADICULAR PAIN FOLLOWING LUMBAR

          SURGERY

           

ALLERGIES

           

          JACOB-SELTZER: CHEST PAIN

          CHLORPHENIRAMINE MALEATE: UNSURE

          PHENYLTOLOXAMINE-ACETAMINOPHEN: UNSURE

           

SURGICAL HISTORY

           

          L4-L5 DISC FUSION 

          DISC REMOVAL LOW BACK 2009

          CHOLECYSTECTOMY 

          RIGHT SIDED HERNIA REPAIR 

          LEFT SIDED HERNIA REPAIR 

           

SOCIAL HISTORY

           

          GENERAL:

           

          TOBACCO USE

          ARE YOU A:NONSMOKER QUIT SMOKING YEARS AGO

           

           

          ALCOHOL SCREENING

          DID YOU HAVE A DRINK CONTAINING ALCOHOL IN THE PAST YEAR?YES

          POINTS1

          INTERPRETATIONNEGATIVE

          HOW OFTEN DID YOU HAVE A DRINK CONTAINING ALCOHOL IN THE PAST

          YEAR?MONTHLY OR LESS (1 POINT)

           

           

          CAFFEINE

          CAFFEINE USE?YES

           

           

          DIET: DIABETIC.

           

           

          EXERCISE: DAILY, WALKS, OUTDOORS PERSON, KEEPS BUSY WHEN HE CAN

          HEART RATE CAN DROP DOWN TO 50S AND HE WILL SLEEP ALL DAY (MD

          SAYS PROBLEM WITH BLOOD FLOW)..

           

           

          MARITAL STATUS: .

           

           

          [place]

          [id number] Yazidism NO Jainism BELIEFS THAT WOULD IMPACT

          HEALTH CARE.

           

           

          LANGUAGE

          LANGUAGES SPOKEN:ENGLISH

           

           

          LEARNING BARRIERS / SPECIAL NEEDS

          CHANGE FROM LAST VISIT?NO

          BARRIERS TO LEARNING?NO

          HEARING IMPAIRED?YES

          :HEARING AIDES

          VISION IMPAIRED?YES

          :CORRECTIVE LENSES

          COGNITIVELY IMPAIRED?NO

          READINESS TO LEARN?YES

          LEARNING PREFERENCES?NO

          LEARNING CAPABILITIES PRESENT?YES

          EMOTIONAL BARRIERS?NO

          SPECIAL DEVICES?NO

           NEEDED?NO

           

           

          PAIN CLINIC PFS, CLERGY, PUBLIC HEALTH REFERRALS

          PFS REFERRAL NEEDED?NO

          CLERGY REFERRAL NEEDED?NO

          PUBLIC HEALTH REFERRAL NEEDED?NO

          WAS THE PROVIDER NOTIFIED OF ANY PERTINENT INFO?NO

          HAS THE PATIENT BEEN EDUCATED REGARDING HIS/HER PLAN OF CARE?YES

          HAS THE PATIENT BEEN EDUCATED REGARDING PAIN, THE RISK FOR PAIN,

          THE IMPORTANCE OF EFFECTIVE PAIN MANAGEMENT, AND THE PAIN

          ASSESSMENT PROCESS?YES

           

           

          PATIENT: ____.

           

           

          ADVANCE DIRECTIVES

          HEALTH CARE PROXY?YES

          NAME OF HCP DAMIR GRACE ([facility])PHONE # [phone]

          DO YOU HAVE A COPY WITH YOU?NO

          DO YOU HAVE A DNR?NO

          WOULD YOU LIKE MORE INFORMATION?NO

          LIVING WILL?YES

          DO YOU HAVE A COPY WITH YOU?NO

          POWER OF ?YES

          NAME OF ED? TAYLOR [phone]

          DO YOU HAVE A COPY WITH YOU?NO

          HAVE YOU HAD A COPY OF ANY ADVANCED DIRECTIVE (LISTED ABOVE) ON A

          PREVIOUS MEDICAL RECORDS AT [facility]?NO

           

HOSPITALIZATION/MAJOR DIAGNOSTIC PROCEDURE

           

          SEE ABOVE

          HAD CARDIAC CATH 1 WEEK AGO (AUG 17) WITH RESULTS NEGATIVE

           

REVIEW OF SYSTEMS

           

      REVIEWED BY:

           

          PROVIDER:    _____ .

           

      CONSTITUTIONAL:

           

          ANY CHANGE IN YOUR MEDICAL CONDITION?    NO . CHILLS    NO .

          FEVER    NO .

           

      INFECTION:

           

          DO YOU HAVE NEW INFECTIONS?    NO . DO YOU HAVE HISTORY OF MRSA? 

            NO .

           

      MUSCULOSKELETAL:

           

          ANY NEW PATTERNS OF PAIN OR NUMBNESS?    NO .

           

      GASTROENTEROLOGY:

           

          ANY NEW CHANGE IN BOWEL CONTROL?    NO .

           

      GENITOURINARY:

           

          ANY NEW CHANGE IN BLADDER CONTROL?    NO . IS THERE A CHANCE YOU

          COULD BE PREGNANT?    NO .

           

      HEMATOLOGY/LYMPH:

           

          DO YOU TAKE ANY BLOOD THINNERS? (FOR EXAMPLE- COUMADIN, PLAVIX,

          AGGRENOX, PLATEL, PRADAXA, OR XARELTO)    YES . WHEN WAS YOUR

          LAST DOSE?    DATE: TIME: PLAVIX ONLY NOW .

           

      NEUROLOGY:

           

          HAVE YOU FALLEN IN THE PAST 6 MONTHS?    NO . ANY NEW EXTREMITY

          NUMBNESS OR WEAKNESS?    NO .

           

      CARDIOLOGY:

           

          DO YOU HAVE A PACEMAKER OR DEFIBRILLATOR?    NO . CHEST PAIN   

          SOME DISCOMFORT BUT NOT AS INTENSE. .

           

      RESPIRATORY:

           

          HAVE YOU BEEN SICK IN THE PAST WEEK?    NO . FEVER    NO . FLU

          LIKE SYMPTOMS?    NO . COUGH    NO .

           

      INTEGUMENTARY:

           

          DO YOU HAVE ANY RASHES OR OPEN SORES?    NO .

           

      ALLERGIC/IMMUNO:

           

          ARE YOU ALLERGIC TO SHELLFISH OR IV DYE?    NO . ANY NEW

          ALLERGIES?    NO .

           

      PSYCHIATRIC:

           

          DO YOU HAVE THOUGHTS OF HURTING YOURSELF OR SOMEONE ELSE?    NO .

          ARE YOU ABUSED, NEGLECTED, OR IN AN UNSAFE ENVIRONMENT?    NO .

           

      ENDOCRINOLOGY:

           

          ARE YOU DIABETIC?    YES .

           

      OTHER:

           

          DO YOU NEED ANY PRESCRIPTIONS?    NO . IF YES, PLEASE LIST:   

          ____ . ANY NEW PROBLEMS WITH YOUR MEDICATIONS?    NO . WHEN DID

          YOU LAST EAT?    ____ . WHEN DID YOU LAST DRINK?    ____ . WHAT

          DID YOU LAST DRINK?    ____ . NAME OF PERSON DRIVING YOU HOME?   

          ____ . DO YOU HAVE ANY OTHER QUESTIONS OR CONCERNS    NO .

           

VITAL SIGNS

           

          .8 LBS, HT 5 FT 6 IN, BMI 31.92 INDEX, /79 MM HG,

          REPEAT /80 MANUAL, HR 93 /MIN, RR 18 /MIN, TEMP 97.5 F,

          OXYGEN SAT % 93%, NA INITIALS TL 1336, REVIEWED BY: NL.

           

ASSESSMENTS

           

          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

          (PRIMARY)

           

          INTERVERTEBRAL DISC DISORDER WITH RADICULOPATHY OF LUMBAR REGION

          - M51.16

           

TREATMENT

           

      POSTLAMINECTOMY SYNDROME, NOT ELSEWHERE CLASSIFIED

          NOTES: CALL WHEN MEDS DUE.SIT/STAND AND MOVE AS TOLERATED. OK TO

          TRY MUSCLE RUB/BIOFREEZE TO LOW BACK.SURGEON (IRMA) IS

          TALKING ABOUT HAVING A DORSAL COLUMN STIM TRIAL DONE.WILL DISCUSS

          WITH DR PARKER.

           

PROCEDURE CODES

           

           ESTABILISHED PATIENT [facility] CHARGE

           

DISPOSITION & COMMUNICATION

           

FOLLOW UP

           

          DR PARKER FOR DCS DISCUSSION (REASON: BACK PAIN)

           

 

ELECTRONICALLY SIGNED BY TONYA BARLKEY ON

          2017 AT 08:47 AM EST

           

           

           

 

DISCLAIMER :

THIS IS A VISIT SUMMARY EXTRACTED FROM THE [employer] CHART.

IT IS NOT A COPY OF THE [employer] PROGRESS NOTE.

RASHEED

## 2017-12-08 ENCOUNTER — HOSPITAL ENCOUNTER (OUTPATIENT)
Dept: HOSPITAL 53 - M PAIN | Age: 67
End: 2017-12-08
Attending: ANESTHESIOLOGY
Payer: MEDICARE

## 2017-12-08 DIAGNOSIS — Z95.5: ICD-10-CM

## 2017-12-08 DIAGNOSIS — Z79.01: ICD-10-CM

## 2017-12-08 DIAGNOSIS — G89.29: ICD-10-CM

## 2017-12-08 DIAGNOSIS — Z79.899: ICD-10-CM

## 2017-12-08 DIAGNOSIS — I10: ICD-10-CM

## 2017-12-08 DIAGNOSIS — E03.9: ICD-10-CM

## 2017-12-08 DIAGNOSIS — Z79.891: ICD-10-CM

## 2017-12-08 DIAGNOSIS — M54.5: ICD-10-CM

## 2017-12-08 DIAGNOSIS — M46.1: ICD-10-CM

## 2017-12-08 DIAGNOSIS — M51.16: ICD-10-CM

## 2017-12-08 DIAGNOSIS — Z87.891: ICD-10-CM

## 2017-12-08 DIAGNOSIS — Z79.84: ICD-10-CM

## 2017-12-08 DIAGNOSIS — Z88.8: ICD-10-CM

## 2017-12-08 DIAGNOSIS — M96.1: Primary | ICD-10-CM

## 2017-12-08 DIAGNOSIS — E11.9: ICD-10-CM

## 2017-12-20 ENCOUNTER — HOSPITAL ENCOUNTER (OUTPATIENT)
Dept: HOSPITAL 53 - M PAIN | Age: 67
End: 2017-12-20
Attending: ANESTHESIOLOGY
Payer: MEDICARE

## 2017-12-20 DIAGNOSIS — M96.1: Primary | ICD-10-CM

## 2017-12-20 DIAGNOSIS — Z79.899: ICD-10-CM

## 2017-12-20 DIAGNOSIS — E11.9: ICD-10-CM

## 2017-12-20 DIAGNOSIS — I25.2: ICD-10-CM

## 2017-12-20 DIAGNOSIS — I10: ICD-10-CM

## 2017-12-20 DIAGNOSIS — Z79.891: ICD-10-CM

## 2017-12-20 DIAGNOSIS — Z79.84: ICD-10-CM

## 2017-12-20 DIAGNOSIS — Z95.5: ICD-10-CM

## 2017-12-20 DIAGNOSIS — E03.9: ICD-10-CM

## 2017-12-20 PROCEDURE — 62323 NJX INTERLAMINAR LMBR/SAC: CPT

## 2017-12-20 NOTE — REP
Partial lumbar spine series:  Four views

 

History:  Injection procedure for pain.

 

10 seconds of fluoroscopy time is reported.

 

Findings:  A sequence of four fluoroscopically obtained last image hold

procedural spot radiographs of the lumbar spine document needle position and

contrast injection associated with injection procedure.

 

 

Signed by

Jonn Fernandes MD 12/21/2017 08:09 A

## 2017-12-25 NOTE — ECWPNPC
PATIENT NAME: DAMIR GRACE

: 1950

GENDER: MALE

MRN: [id number]

VISIT DATE: 2017

DISCHARGE DATE: 17 1409

VISIT LOCKED DATE TIME: 

PHYSICIAN: MEDINA PARKER  

RESOURCE: MEDINA PARKER  

 

           

           

REASON FOR APPOINTMENT

           

          1. BACK PAIN

           

HISTORY OF PRESENT ILLNESS

           

      HISTORY OF PRESENT ILLNESS:

      PAIN

           

           

          THE PATIENT DESCRIBES THE PAIN...

           

           67 YEAR OLD MALE PATIENT WITH HISTORY OF CHRONIC LOW BACK PAIN.

          PATIENT DESCRIBES THE PAIN AS TENDER AND SORE AND HAVING IT ALL

          THE TIME WITH A PAIN SCORE OF 6/10. PATIENT RECEIVED A CAUDAL

          EPIDURAL ON 17 AND REPORTS SEVERAL WEEKS OF GOOD PAIN

          RELIEF. PATIENT STATES THAT HIS PAIN NOW GOES DOWN HIS LEGS AND

          MAKES IT DIFFICULT TO DO EVERYDAY THINGS. MR. GRACE STATES THAT

          IT IS DIFFICULT FOR HIM TO WALK AND HE CAN NOT WALK LONG

          DISTANCES. PATIENT DENIES UNEXPLAINABLE WEIGHT LOSS, FEVER,

          CHILLS, NEW CHANGES ON HIS URINARY OR BOWEL CONTROL.

           

      FALL RISK SCREENING:

      SCREENING

           

           

          :NO FALLS IN THE PAST YEAR

           

CURRENT MEDICATIONS

           

          TAKING METFORMIN  MG TABLET 1 TABLET WITH MEALS ORALLY

          DAILY

          TAKING IRON (FERROUS GLUCONATE) 256 (28 FE) MG TABLET ORALLY

          TAKES 65 MG DAILY AT SUPPERTIME

          TAKING METOPROLOL TARTRATE 50 MG TABLET 1 TABLET WITH FOOD ORALLY

          DAILY

          TAKING VENLAFAXINE HCL  MG CAPSULE EXTENDED RELEASE 24 HOUR

          1 CAPSULE WITH FOOD ORALLY AT BEDTIME

          TAKING NITROGLYCERIN 0.4 MG TABLET SUBLINGUAL SUBLINGUAL AS

          NEEDED

          TAKING NITROGLYCERIN 0.4 MG/HR PATCH 24 HOUR (PRIOR AUTH: RX

          REF#:[id number]) TRANSDERMAL DAILY

          TAKING ATORVASTATIN CALCIUM 20 MG TABLET (PRIOR AUTH: RX

          REF#:[id number]) ORAL DAILY

          TAKING PANTOPRAZOLE SODIUM 40 MG TABLET DELAYED RELEASE (PRIOR

          AUTH: RX REF#:[id number]) ORAL DAILY

          TAKING LISINOPRIL 20 MG TABLET (PRIOR AUTH: RX REF#:[id number])

          ORAL 2 IF NEEDED IN AM

          TAKING TOPROL XL 50 MG TABLET EXTENDED RELEASE 24 HOUR (PRIOR

          AUTH: RX REF#:[id number]) ORAL

          TAKING CLOPIDOGREL BISULFATE 75 MG TABLET (PRIOR AUTH: RX

          REF#:[id number]) ORAL DAILY

          TAKING AMLODIPINE BESYLATE 5 MG TABLET (PRIOR AUTH: RX

          REF#:[id number]) ORAL DAILY

          TAKING ISOSORBIDE MONONITRATE ER 30 MG TABLET EXTENDED RELEASE 24

          HOUR (PRIOR AUTH: RX REF#:[id number]) ORAL DAILY

          TAKING SYNTHROID 100 MCG TABLET (PRIOR AUTH: RX

          REF#:[id number]) ORAL DAILY

          TAKING VENLAFAXINE HCL ER 37.5 MG CAPSULE EXTENDED RELEASE 24

          HOUR (PRIOR AUTH: RX REF#:[id number]) ORAL DAILY

          TAKING NORCO 5-325 MG TABLET 1 TABLET AS NEEDED ORALLY TAKE 1

          DAILY IF NEEDED FOR PAIN MDD=1

          DISCONTINUED RANEXA 500 MG TABLET EXTENDED RELEASE 12 HOUR (PRIOR

          AUTH: RX REF#:[id number]) ORAL BID, NOTES: STOPPED 

          MEDICATION LIST REVIEWED AND RECONCILED WITH THE PATIENT

           

PAST MEDICAL HISTORY

           

          HTN

          DM

          MI WITH HX STENT PLACEMENT

          ATYPICAL CHEST PAIN

          HYPOTHYROIDISM

          LOW BACK PAIN WITH LEFT SIDED RADICULAR PAIN FOLLOWING LUMBAR

          SURGERY

           

ALLERGIES

           

          JACOB-SELTZER: CHEST PAIN

          CHLORPHENIRAMINE MALEATE: UNSURE

          PHENYLTOLOXAMINE-ACETAMINOPHEN: UNSURE

           

SOCIAL HISTORY

           

          GENERAL:

           

          TOBACCO USE

          ARE YOU A:NONSMOKER QUIT SMOKING YEARS AGO

           

           

          ALCOHOL SCREENING

          DID YOU HAVE A DRINK CONTAINING ALCOHOL IN THE PAST YEAR?YES

          HOW OFTEN DID YOU HAVE A DRINK CONTAINING ALCOHOL IN THE PAST

          YEAR?MONTHLY OR LESS (1 POINT)

          POINTS1

          INTERPRETATIONNEGATIVE

           

           

          CAFFEINE

          CAFFEINE USE?YES

           

           

          DIET: DIABETIC.

           

           

          EXERCISE: DAILY, WALKS, OUTDOORS PERSON, KEEPS BUSY WHEN HE CAN

          HEART RATE CAN DROP DOWN TO 50S AND HE WILL SLEEP ALL DAY (MD

          SAYS PROBLEM WITH BLOOD FLOW)..

           

           

          MARITAL STATUS: .

           

           

          Holiness

          [id number] Mu-ism NO Methodist BELIEFS THAT WOULD IMPACT

          HEALTH CARE.

           

           

          LANGUAGE

          LANGUAGES SPOKEN:ENGLISH

           

           

          LEARNING BARRIERS / SPECIAL NEEDS

          CHANGE FROM LAST VISIT?NO

          BARRIERS TO LEARNING?NO

          HEARING IMPAIRED?YES

          :HEARING AIDES

          VISION IMPAIRED?YES

          :CORRECTIVE LENSES

          COGNITIVELY IMPAIRED?NO

          READINESS TO LEARN?YES

          LEARNING PREFERENCES?NO

          LEARNING CAPABILITIES PRESENT?YES

          EMOTIONAL BARRIERS?NO

          SPECIAL DEVICES?NO

           NEEDED?NO

           

           

          PAIN CLINIC PFS, CLERGY, PUBLIC HEALTH REFERRALS

          PFS REFERRAL NEEDED?NO

          CLERGY REFERRAL NEEDED?NO

          PUBLIC HEALTH REFERRAL NEEDED?NO

          WAS THE PROVIDER NOTIFIED OF ANY PERTINENT INFO?NO

          HAS THE PATIENT BEEN EDUCATED REGARDING HIS/HER PLAN OF CARE?YES

          HAS THE PATIENT BEEN EDUCATED REGARDING PAIN, THE RISK FOR PAIN,

          THE IMPORTANCE OF EFFECTIVE PAIN MANAGEMENT, AND THE PAIN

          ASSESSMENT PROCESS?YES

           

           

          PATIENT: ____.

           

           

          ADVANCE DIRECTIVES

          HEALTH CARE PROXY?YES

          NAME OF HCP DAMIR GRACE ([facility])PHONE # [phone]

          DO YOU HAVE A COPY WITH YOU?NO

          DO YOU HAVE A DNR?NO

          WOULD YOU LIKE MORE INFORMATION?NO

          LIVING WILL?YES

          DO YOU HAVE A COPY WITH YOU?NO

          POWER OF ?YES

          NAME OF POJHOAN? TAYLOR [phone]

          DO YOU HAVE A COPY WITH YOU?NO

          HAVE YOU HAD A COPY OF ANY ADVANCED DIRECTIVE (LISTED ABOVE) ON A

          PREVIOUS MEDICAL RECORDS AT [facility]?NO

           

REVIEW OF SYSTEMS

           

      REVIEWED BY:

           

          PROVIDER:    MEDINA PARKER MD .

           

      CONSTITUTIONAL:

           

          ANY CHANGE IN YOUR MEDICAL CONDITION?    NO . CHILLS    NO .

          FEVER    NO .

           

      INFECTION:

           

          DO YOU HAVE NEW INFECTIONS?    NO . DO YOU HAVE HISTORY OF MRSA? 

            NO .

           

      MUSCULOSKELETAL:

           

          ANY NEW PATTERNS OF PAIN OR NUMBNESS?    NO .

           

      GASTROENTEROLOGY:

           

          ANY NEW CHANGE IN BOWEL CONTROL?    NO .

           

      GENITOURINARY:

           

          ANY NEW CHANGE IN BLADDER CONTROL?    NO . IS THERE A CHANCE YOU

          COULD BE PREGNANT?    NO .

           

      HEMATOLOGY/LYMPH:

           

          DO YOU TAKE ANY BLOOD THINNERS? (FOR EXAMPLE- COUMADIN, PLAVIX,

          AGGRENOX, PLATEL, PRADAXA, OR XARELTO)    YES, PLAVIX . WHEN WAS

          YOUR LAST DOSE?    DATE: TIME: 17 0745 .

           

      NEUROLOGY:

           

          HAVE YOU FALLEN IN THE PAST 6 MONTHS?    NO . ANY NEW EXTREMITY

          NUMBNESS OR WEAKNESS?    NO .

           

      CARDIOLOGY:

           

          DO YOU HAVE A PACEMAKER OR DEFIBRILLATOR?    NO .

           

      RESPIRATORY:

           

          HAVE YOU BEEN SICK IN THE PAST WEEK?    NO . FEVER    NO . FLU

          LIKE SYMPTOMS?    NO . COUGH    NO .

           

      INTEGUMENTARY:

           

          DO YOU HAVE ANY RASHES OR OPEN SORES?    NO .

           

      ALLERGIC/IMMUNO:

           

          ARE YOU ALLERGIC TO SHELLFISH OR IV DYE?    NO . ANY NEW

          ALLERGIES?    NO .

           

      PSYCHIATRIC:

           

          DO YOU HAVE THOUGHTS OF HURTING YOURSELF OR SOMEONE ELSE?    NO .

          ARE YOU ABUSED, NEGLECTED, OR IN AN UNSAFE ENVIRONMENT?    NO .

           

      ENDOCRINOLOGY:

           

          ARE YOU DIABETIC?    YES .

           

      OTHER:

           

          DO YOU NEED ANY PRESCRIPTIONS?    NO . IF YES, PLEASE LIST:   

          ____ . ANY NEW PROBLEMS WITH YOUR MEDICATIONS?    NO . WHEN DID

          YOU LAST EAT?    ____ . WHEN DID YOU LAST DRINK?    ____ . WHAT

          DID YOU LAST DRINK?    ____ . NAME OF PERSON DRIVING YOU HOME?   

          ____ . DO YOU HAVE ANY OTHER QUESTIONS OR CONCERNS    YES,

          PROCEDURE/OR IMPLANT PLACE & TIME.  .

           

VITAL SIGNS

           

          .2 LBS, HT 5 FT 6 IN, BMI 32.15 INDEX, /82 MM HG, HR

          75 /MIN, RR 16 /MIN, TEMP 97.5 F, OXYGEN SAT % 96%, NA INITIALS

          TL 1203, REVIEWED BY: CM.

           

EXAMINATION

           

       : PATIENT IS ALERT O X 3 AND COOPERATIVE.

          TENDERNESS IN THE LOWER BACK AND PARASPINAL MUSCLE GROUP. MRI

          DONE ON 17 OF THE LUMBAR SPINE SHOWS DISC BULGE AT L3-L4.

          PATIENT LIMPING FROM LEFT LEG. LEFT LEG IS WEAKER THEN THE RIGHT

          AT EXTENSION AND FLEXION. PAIN IN THE SACROILIAC JOINT AREA.

           

ASSESSMENTS

           

          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

          (PRIMARY)

           

          INTERVERTEBRAL DISC DISORDER WITH RADICULOPATHY OF LUMBAR REGION

          - M51.16

           

          SACROILIITIS, NOT ELSEWHERE CLASSIFIED - M46.1

           

TREATMENT

           

      POSTLAMINECTOMY SYNDROME, NOT ELSEWHERE CLASSIFIED

          START GABAPENTIN CAPSULE, 300 MG, 1 CAPSULE BEFORE BEDTIME,

          ORALLY FOR PAIN, ONCE A DAY MDD1, 30 DAY(S), 30, REFILLS 1

          NOTES: WE DISCUSSED SEVERAL ISSUES WITH MR. GRACE'S PAIN

          MANAGEMENT CASE. AT THIS TIME THE PATIENT WILL START GABAPENTIN

          FOR THE NEUROPATHIC PAIN HE IS HAVING. WE DISCUSSED MOVING

          FORWARD WITH THE DCS TRIAL PATIENT WILL NEED TO SPEAK WITH HIS

          CARDIOLOGIST ABOUT BEING OFF THE PLAVIX AND POSSIBLY USING A

          LOVENOX BRIDGE DURING THIS TRIAL. PATIENT WILL ALSO BE REFERRED

          TO RADHA KRISHNAMURTHY FOR THE LOWER BACK LEAK HE IS HAVING. DUE TO THE

          RADICULAR PAIN AND HISTORY OF BACK SURGERY I WOULD LIKE TO

          PROCEED WITH A CAUDAL EPIDURAL. WE DISCUSSED THE RISKS,

          BENENFITS, AND ALTNERATIVES OF THE INJECTION AND THE PATIENT

          WOULD LIKE TO PROCEED AT THIS TIME. INSTRUCTIONS WERE GIVEN,

          QUESTIONS WERE ANSWERED, PATIENT REPORTS UNDERSTANDING AND AGREES

          WITH THE PLAN. I, BRISSA MANDUJANO, DOCUMENTED THE ABOVE

          INFORMATION ACTING AS A SCRIBE FOR DR. PARKER. I HAVE REVIEWED

          THE ABOVE DOCUMENT, WRITTEN BY BRISSA BREWER AND I VERIFY

          THAT IT IS ACCURATE.

           

PREVENTIVE MEDICINE

           

          REVIEWED PREPROCEDURE CARE WITH PT EXPRESSING UNDERSTANDING OF

          HOLDING PLAVIX AND METFORMIN.

           

PROCEDURE CODES

           

           ESTABILISHED PATIENT [facility] FACILITY CHARGE

           

           DOC MEDS VERIFIED W/PT OR RE

           

           PAIN ASSESS POS TOOL F/U PLAN DOC

           

DISPOSITION & COMMUNICATION

           

FOLLOW UP

           

          CAUDAL/LESI AFTER APPROVAL

           

 

ELECTRONICALLY SIGNED BY MEDINA PARKER MD ON

          2017 AT 10:51 PM EST

           

           

           

 

DISCLAIMER :

THIS IS A VISIT SUMMARY EXTRACTED FROM THE [employer] CHART.

IT IS NOT A COPY OF THE [employer] PROGRESS NOTE.

RASHEED

## 2017-12-28 NOTE — ECWPNPC
PATIENT NAME: DAMIR GRACE

: 1950

GENDER: MALE

MRN: [id number]

VISIT DATE: 2017

DISCHARGE DATE: 17 1256

VISIT LOCKED DATE TIME: 

PHYSICIAN: MEDINA PARKER  

RESOURCE: MEDINA PARKER  

 

           

           

REASON FOR APPOINTMENT

           

          1. CAUDAL

           

HISTORY OF PRESENT ILLNESS

           

      HISTORY OF PRESENT ILLNESS:

      PAIN

           

           

          THE PATIENT DESCRIBES THE PAIN...

           

      FALL RISK SCREENING:

      SCREENING

           

           

          :NO FALLS IN THE PAST YEAR

           

CURRENT MEDICATIONS

           

          TAKING METFORMIN  MG TABLET 1 TABLET WITH MEALS ORALLY

          DAILY, NOTES: 17

          TAKING IRON (FERROUS GLUCONATE) 256 (28 FE) MG TABLET ORALLY

          TAKES 65 MG DAILY AT SUPPERTIME, NOTES: 17

          TAKING METOPROLOL TARTRATE 50 MG TABLET 1 TABLET WITH FOOD ORALLY

          DAILY, NOTES: 17 0700

          TAKING VENLAFAXINE HCL  MG CAPSULE EXTENDED RELEASE 24 HOUR

          1 CAPSULE WITH FOOD ORALLY AT BEDTIME, NOTES: 17

          TAKING NITROGLYCERIN 0.4 MG TABLET SUBLINGUAL SUBLINGUAL AS

          NEEDED, NOTES: NOT LATELY

          TAKING NITROGLYCERIN 0.4 MG/HR PATCH 24 HOUR (PRIOR AUTH: RX

          REF#:[id number]) TRANSDERMAL DAILY, NOTES: PATCH ON

          TAKING PANTOPRAZOLE SODIUM 40 MG TABLET DELAYED RELEASE (PRIOR

          AUTH: RX REF#:[id number]) ORAL DAILY, NOTES: 17 0600

          TAKING LISINOPRIL 20 MG TABLET (PRIOR AUTH: RX REF#:[id number])

          ORAL 2 IF NEEDED IN AM, NOTES: 17 0800

          TAKING TOPROL XL 50 MG TABLET EXTENDED RELEASE 24 HOUR (PRIOR

          AUTH: RX REF#:[id number]) ORAL , NOTES: 17

          TAKING CLOPIDOGREL BISULFATE 75 MG TABLET (PRIOR AUTH: RX

          REF#:[id number]) ORAL DAILY, NOTES: 17

          TAKING AMLODIPINE BESYLATE 5 MG TABLET (PRIOR AUTH: RX

          REF#:[id number]) ORAL DAILY, NOTES: 17 0800

          TAKING ISOSORBIDE MONONITRATE ER 30 MG TABLET EXTENDED RELEASE 24

          HOUR (PRIOR AUTH: RX REF#:[id number]) ORAL DAILY, NOTES:

          17 0800

          TAKING SYNTHROID 100 MCG TABLET (PRIOR AUTH: RX

          REF#:[id number]) ORAL DAILY, NOTES: 17 0800

          TAKING VENLAFAXINE HCL ER 37.5 MG CAPSULE EXTENDED RELEASE 24

          HOUR (PRIOR AUTH: RX REF#:[id number]) ORAL DAILY, NOTES:

          17 0700

          TAKING NORCO 5-325 MG TABLET 1 TABLET AS NEEDED ORALLY TAKE 1

          DAILY IF NEEDED FOR PAIN MDD=1, NOTES: 2100

          TAKING GABAPENTIN 300 MG CAPSULE 1 CAPSULE BEFORE BEDTIME ORALLY

          FOR PAIN ONCE A DAY MDD1, NOTES: 17

          UNKNOWN ATORVASTATIN CALCIUM 20 MG TABLET (PRIOR AUTH: RX

          REF#:[id number]) ORAL DAILY

          MEDICATION LIST REVIEWED AND RECONCILED WITH THE PATIENT

           

PAST MEDICAL HISTORY

           

          HTN

          DM

          MI WITH HX STENT PLACEMENT

          ATYPICAL CHEST PAIN

          HYPOTHYROIDISM

          LOW BACK PAIN WITH LEFT SIDED RADICULAR PAIN FOLLOWING LUMBAR

          SURGERY

           

ALLERGIES

           

          JACOB-SELTZER: CHEST PAIN

          CHLORPHENIRAMINE MALEATE: UNSURE

          PHENYLTOLOXAMINE-ACETAMINOPHEN: UNSURE

           

REVIEW OF SYSTEMS

           

      REVIEWED BY:

           

          PROVIDER:    _____ .

           

      CONSTITUTIONAL:

           

          ANY CHANGE IN YOUR MEDICAL CONDITION?    NO . CHILLS    NO .

          FEVER    NO .

           

      INFECTION:

           

          DO YOU HAVE NEW INFECTIONS?    NO . DO YOU HAVE HISTORY OF MRSA? 

            NO .

           

      MUSCULOSKELETAL:

           

          ANY NEW PATTERNS OF PAIN OR NUMBNESS?    NO .

           

      GASTROENTEROLOGY:

           

          ANY NEW CHANGE IN BOWEL CONTROL?    NO .

           

      GENITOURINARY:

           

          ANY NEW CHANGE IN BLADDER CONTROL?    NO . IS THERE A CHANCE YOU

          COULD BE PREGNANT?    NO .

           

      HEMATOLOGY/LYMPH:

           

          DO YOU TAKE ANY BLOOD THINNERS? (FOR EXAMPLE- COUMADIN, PLAVIX,

          AGGRENOX, PLATEL, PRADAXA, OR XARELTO)    NO . WHEN WAS YOUR LAST

          DOSE?    DATE: TIME:  .

           

      NEUROLOGY:

           

          HAVE YOU FALLEN IN THE PAST 6 MONTHS?    NO . ANY NEW EXTREMITY

          NUMBNESS OR WEAKNESS?    NO .

           

      CARDIOLOGY:

           

          DO YOU HAVE A PACEMAKER OR DEFIBRILLATOR?    NO .

           

      RESPIRATORY:

           

          HAVE YOU BEEN SICK IN THE PAST WEEK?    NO . FEVER    NO . FLU

          LIKE SYMPTOMS?    NO . COUGH    NO .

           

      INTEGUMENTARY:

           

          DO YOU HAVE ANY RASHES OR OPEN SORES?    NO .

           

      ALLERGIC/IMMUNO:

           

          ARE YOU ALLERGIC TO SHELLFISH OR IV DYE?    NO . ANY NEW

          ALLERGIES?    NO .

           

      PSYCHIATRIC:

           

          DO YOU HAVE THOUGHTS OF HURTING YOURSELF OR SOMEONE ELSE?    NO .

          ARE YOU ABUSED, NEGLECTED, OR IN AN UNSAFE ENVIRONMENT?    NO .

           

      ENDOCRINOLOGY:

           

          ARE YOU DIABETIC?    NO .

           

      OTHER:

           

          DO YOU NEED ANY PRESCRIPTIONS?    NO . IF YES, PLEASE LIST:   

          ____ . ANY NEW PROBLEMS WITH YOUR MEDICATIONS?    NO . WHEN DID

          YOU LAST EAT?    ____ . WHEN DID YOU LAST DRINK?    ____ . WHAT

          DID YOU LAST DRINK?    ____ . NAME OF PERSON DRIVING YOU HOME?   

          ____ . DO YOU HAVE ANY OTHER QUESTIONS OR CONCERNS    NO .

           

VITAL SIGNS

           

          .4 LBS, HT 66", BMI 31.70 INDEX, /80 MM HG, HR 75

          /MIN, RR 16 /MIN, TEMP 96.5 F, OXYGEN SAT % 98%, NA INITIALS TL

          1057.

           

ASSESSMENTS

           

          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

          (PRIMARY)

           

PROCEDURES

           

           

          PRE PROCEDURE DIAGNOSIS    LUMBOSACRAL DISC DISORDER WITH

          RADICULOPATHY

          POST PROCEDURE DIAGNOSIS    LUMBOSACRAL DISC DISORDER WITH

          RADICULOPATHY

          PROCEDURE    LUMBAR EPIDURAL STEROID INJECTION UNDER FLUOROSCOPIC

          GUIDANCE

          SURGEON    DR. MEDINA PARKER

          ASSISTANT    NONE

          ANESTHESIA    LOCAL

          PRE PROCEDURE NOTE    THE PATIENT HAS A HISTORY OF CHRONIC LOW

          BACK PAIN. I EVALUATE THE PATIENT AND REVIEWED THE CHART. I WENT

          OVER THE RISKS, ALTERNATIVES, AND BENEFITS ASSOCIATED WITH THIS

          PROCEDURE. THE PATIENT WOULD LIKE TO PROCEED AND GIVE CONSENT TO

          PERFORMED THE PROCEDURE. THE PATIENT DENIES UNEXPLAINABLE WEIGHT

          LOSS, FEVER, CHILLS, OR NEW CHANGES IN URINARY OR BOWEL CONTROL.

          DESCRIPTION OF PROCEDURE    THE PATIENT WAS BROUGHT TO THE

          PROCEDURE ROOM AND PLACED IN THE PRONE POSITION. THE LUMBOSACRAL

          AREA WAS CLEANED WITH BETADINE SOLUTION AND DRAPED ASEPTICALLY.

          THE PROCEDURE WAS DONE UNDER STERILE CONDITIONS. I CHECKED

          LATERALITY AND THE LEVEL WHERE THE PROCEDURE WAS GOING TO BE

          PERFORMED WITH THE PATIENT AND THE SUPPORTING STAFF AT THE MOMENT

          OF THE TIME OUT IN THE PROCEDURE ROOM. UNDER FLUOROSCOPIC

          GUIDANCE, THE TARGET POINT WAS SELECTED AT THE INTERLAMINAR LEVEL

          OF L5-S1. LIDOCAINE WAS USED TO NUMB THE SKIN AND THE

          SUBCUTANEOUS TISSUE BELOW IT. EPIDURAL TUOHY NEEDLE, 17-GAUGE,

          WAS ADVANCED UNDER FLUOROSCOPIC GUIDANCE AND FOLLOWING PATIENT

          FEEDBACK UNTIL THE EPIDURAL SPACE WAS REACHED, 7 CM DEEP INTO THE

          SKIN BY THE LOSS OF RESISTANCE TECHNIQUE. ISOVUE M DYE 30%, 0.25

          ML, WAS INJECTED SHOWING ADEQUATE SPREAD OF THE DYE. THEN, A

          SOLUTION OF 3 ML OF NORMAL SALINE WITH DEPO-MEDROL 60 MG WAS

          INJECTED SLOWLY FOLLOWING PATIENT FEEDBACK. THERE WAS NO EVIDENCE

          OF BLOOD, PARESTHESIA OR CEREBROSPINAL FLUID DURING THE

          PROCEDURE. THE PATIENT WAS SENT TO THE RECOVERY ROOM. THE PATIENT

          WAS MOVING THE EXTREMITIES AND DOING WELL. THERE WAS NO

          COMPLICATION DURING THE PROCEDURE. FLUOROSCOPY TIME WAS 10

          SECONDS.

          POST PROCEDURE NOTE    THE PATIENT WILL BE SEEN IN A FOLLOW UP IN

          THE NEXT FEW WEEKS. INSTRUCTIONS WERE GIVEN, QUESTIONS WERE

          ANSWERED, AND THE PATIENT EXPRESSED UNDERSTANDING AND AGREES WITH

          THE PLAN. I, BRISSA MANDUJANO, DOCUMENTED THE ABOVE INFORMATION

          ACTING AS A SCRIBE FOR DR. PARKER. I HAVE REVIEWED THE ABOVE

          DOCUMENT, WRITTEN BY BRISSA BREWER AND I VERIFY THAT IT

          IS ACCURATE

           

DIAGNOSTIC IMAGING

           

          [facility] FLUORO GUIDE SPINE INJECTION (PAIN)[id number]

           

PROCEDURE CODES

           

          90559 LUMBAR/SACRAL W/ IMAGING

           

          6045F RADXPS IN END [id number] PXD

           

DISPOSITION & COMMUNICATION

           

FOLLOW UP

           

          2 WEEKS

           

 

ELECTRONICALLY SIGNED BY MEDINA PARKER MD ON

          2017 AT 02:20 PM EST

           

           

           

 

DISCLAIMER :

THIS IS A VISIT SUMMARY EXTRACTED FROM THE [employer] CHART.

IT IS NOT A COPY OF THE [employer] PROGRESS NOTE.

[id number]

## 2018-01-17 ENCOUNTER — HOSPITAL ENCOUNTER (OUTPATIENT)
Dept: HOSPITAL 53 - M PAIN | Age: 68
End: 2018-01-17
Attending: NURSE PRACTITIONER
Payer: MEDICARE

## 2018-01-17 DIAGNOSIS — M51.16: ICD-10-CM

## 2018-01-17 DIAGNOSIS — M96.1: Primary | ICD-10-CM

## 2018-01-17 DIAGNOSIS — I10: ICD-10-CM

## 2018-01-17 DIAGNOSIS — Z87.891: ICD-10-CM

## 2018-01-17 DIAGNOSIS — E03.9: ICD-10-CM

## 2018-01-17 DIAGNOSIS — Z95.5: ICD-10-CM

## 2018-01-17 DIAGNOSIS — E11.9: ICD-10-CM

## 2018-01-17 DIAGNOSIS — Z79.84: ICD-10-CM

## 2018-01-17 DIAGNOSIS — Z88.8: ICD-10-CM

## 2018-01-17 DIAGNOSIS — I25.2: ICD-10-CM

## 2018-01-17 DIAGNOSIS — Z79.891: ICD-10-CM

## 2018-01-17 DIAGNOSIS — Z79.899: ICD-10-CM

## 2018-02-22 ENCOUNTER — HOSPITAL ENCOUNTER (OUTPATIENT)
Dept: HOSPITAL 53 - M PAIN | Age: 68
End: 2018-02-22
Attending: NURSE PRACTITIONER
Payer: MEDICARE

## 2018-02-22 DIAGNOSIS — Z88.8: ICD-10-CM

## 2018-02-22 DIAGNOSIS — Z95.828: ICD-10-CM

## 2018-02-22 DIAGNOSIS — I25.2: ICD-10-CM

## 2018-02-22 DIAGNOSIS — E11.9: ICD-10-CM

## 2018-02-22 DIAGNOSIS — Z87.891: ICD-10-CM

## 2018-02-22 DIAGNOSIS — M96.1: Primary | ICD-10-CM

## 2018-02-22 DIAGNOSIS — Z79.899: ICD-10-CM

## 2018-02-22 DIAGNOSIS — I10: ICD-10-CM

## 2018-02-22 DIAGNOSIS — E03.9: ICD-10-CM

## 2018-02-22 DIAGNOSIS — Z79.84: ICD-10-CM

## 2018-02-22 DIAGNOSIS — Z79.01: ICD-10-CM

## 2018-02-22 DIAGNOSIS — M51.16: ICD-10-CM

## 2018-02-22 DIAGNOSIS — Z79.891: ICD-10-CM

## 2018-03-12 ENCOUNTER — HOSPITAL ENCOUNTER (OUTPATIENT)
Dept: HOSPITAL 53 - M PAIN | Age: 68
End: 2018-03-12
Attending: ANESTHESIOLOGY
Payer: MEDICARE

## 2018-03-12 DIAGNOSIS — Z79.01: ICD-10-CM

## 2018-03-12 DIAGNOSIS — E11.9: ICD-10-CM

## 2018-03-12 DIAGNOSIS — G89.29: Primary | ICD-10-CM

## 2018-03-12 DIAGNOSIS — I10: ICD-10-CM

## 2018-03-12 DIAGNOSIS — I25.2: ICD-10-CM

## 2018-03-12 DIAGNOSIS — Z88.8: ICD-10-CM

## 2018-03-12 DIAGNOSIS — Z79.84: ICD-10-CM

## 2018-03-12 DIAGNOSIS — E03.9: ICD-10-CM

## 2018-03-12 DIAGNOSIS — M51.17: ICD-10-CM

## 2018-03-12 DIAGNOSIS — Z87.891: ICD-10-CM

## 2018-03-12 DIAGNOSIS — Z95.5: ICD-10-CM

## 2018-03-12 DIAGNOSIS — Z79.891: ICD-10-CM

## 2018-03-21 ENCOUNTER — HOSPITAL ENCOUNTER (OUTPATIENT)
Dept: HOSPITAL 53 - M PAIN | Age: 68
End: 2018-03-21
Attending: NURSE PRACTITIONER
Payer: MEDICARE

## 2018-03-21 DIAGNOSIS — M96.1: Primary | ICD-10-CM

## 2018-03-21 DIAGNOSIS — M51.16: ICD-10-CM

## 2018-03-21 DIAGNOSIS — M79.1: ICD-10-CM

## 2018-03-21 DIAGNOSIS — I25.2: ICD-10-CM

## 2018-03-21 DIAGNOSIS — E03.9: ICD-10-CM

## 2018-03-21 DIAGNOSIS — Z88.8: ICD-10-CM

## 2018-03-21 DIAGNOSIS — Z79.899: ICD-10-CM

## 2018-03-21 DIAGNOSIS — E11.9: ICD-10-CM

## 2018-03-21 DIAGNOSIS — I10: ICD-10-CM

## 2018-03-21 DIAGNOSIS — Z87.891: ICD-10-CM

## 2018-03-21 DIAGNOSIS — Z95.5: ICD-10-CM

## 2018-03-21 DIAGNOSIS — Z79.01: ICD-10-CM

## 2018-08-10 ENCOUNTER — HOSPITAL ENCOUNTER (OUTPATIENT)
Dept: HOSPITAL 53 - M PAIN | Age: 68
End: 2018-08-10
Attending: NURSE PRACTITIONER
Payer: MEDICARE

## 2018-08-10 DIAGNOSIS — M25.561: ICD-10-CM

## 2018-08-10 DIAGNOSIS — I10: ICD-10-CM

## 2018-08-10 DIAGNOSIS — Z96.651: ICD-10-CM

## 2018-08-10 DIAGNOSIS — Z79.84: ICD-10-CM

## 2018-08-10 DIAGNOSIS — Z79.899: ICD-10-CM

## 2018-08-10 DIAGNOSIS — M51.16: ICD-10-CM

## 2018-08-10 DIAGNOSIS — I25.2: ICD-10-CM

## 2018-08-10 DIAGNOSIS — Z79.01: ICD-10-CM

## 2018-08-10 DIAGNOSIS — Z88.8: ICD-10-CM

## 2018-08-10 DIAGNOSIS — E03.9: ICD-10-CM

## 2018-08-10 DIAGNOSIS — M79.1: Primary | ICD-10-CM

## 2018-08-10 DIAGNOSIS — E11.9: ICD-10-CM

## 2018-08-10 DIAGNOSIS — M96.1: ICD-10-CM

## 2019-03-29 ENCOUNTER — HOSPITAL ENCOUNTER (OUTPATIENT)
Dept: HOSPITAL 53 - M PAIN | Age: 69
End: 2019-03-29
Attending: NURSE PRACTITIONER
Payer: MEDICARE

## 2019-03-29 DIAGNOSIS — E11.9: ICD-10-CM

## 2019-03-29 DIAGNOSIS — I25.2: ICD-10-CM

## 2019-03-29 DIAGNOSIS — M54.2: ICD-10-CM

## 2019-03-29 DIAGNOSIS — Z79.899: ICD-10-CM

## 2019-03-29 DIAGNOSIS — G89.29: ICD-10-CM

## 2019-03-29 DIAGNOSIS — E03.9: ICD-10-CM

## 2019-03-29 DIAGNOSIS — Z95.5: ICD-10-CM

## 2019-03-29 DIAGNOSIS — I10: ICD-10-CM

## 2019-03-29 DIAGNOSIS — Z79.01: ICD-10-CM

## 2019-03-29 DIAGNOSIS — Z96.651: ICD-10-CM

## 2019-03-29 DIAGNOSIS — Z87.891: ICD-10-CM

## 2019-03-29 DIAGNOSIS — Z79.84: ICD-10-CM

## 2019-03-29 DIAGNOSIS — M51.17: Primary | ICD-10-CM

## 2019-03-29 DIAGNOSIS — Z88.8: ICD-10-CM

## 2019-04-02 NOTE — ECWPNPC
PATIENT NAME: DAMIR GRACE

: 1950

GENDER: MALE

MRN: [id number]

VISIT DATE: 2019

DISCHARGE DATE: 19 1519

VISIT LOCKED DATE TIME: 

PHYSICIAN: FAIZA BAHENA  

RESOURCE: FAIZA BAHENA  

 

           

           

REASON FOR APPOINTMENT

           

          1. POST PROC PT OF 

           

HISTORY OF PRESENT ILLNESS

           

      HISTORY OF PRESENT ILLNESS:

      PAIN

           

           

          THE PATIENT DESCRIBES THE PAINDURING THE LAST MONTH AS

          SEVERITY - PAIN SCORE OF7/10

           

           68 YR OLD MALE WITH CHRONIC LOWER BACK PAIN, NOW HAS PAIN IN

          LEFT SHOULDER AND BASE OF NECK. HE WAS REFERERD FOR MRI CERVICAL

          SPINE AND NERVE CONDUCTION STUDY. HE IS AWAITNG RESULTS OF MRI

          AND AND AWAITNG APPT FOR NCS.

           

      FALL RISK SCREENING:

      SCREENING

           

           

          :NO FALLS REPORTED IN THE LAST YEAR

           

CURRENT MEDICATIONS

           

          TAKING METFORMIN  MG TABLET 1 TABLET WITH MEALS ORALLY BID

          TAKING METOPROLOL TARTRATE 50 MG TABLET 1 TABLET WITH FOOD ORALLY

          DAILY

          TAKING VENLAFAXINE HCL ER 75 MG CAPSULE EXTENDED RELEASE 24 HOUR

          1 CAPSULE WITH FOOD ORALLY AT BEDTIME

          TAKING NITROGLYCERIN 0.4 MG TABLET SUBLINGUAL SUBLINGUAL AS

          NEEDED

          TAKING NITROGLYCERIN 0.4 MG/HR PATCH 24 HOUR (PRIOR AUTH: RX

          REF#:[id number]) TRANSDERMAL DAILY

          TAKING PANTOPRAZOLE SODIUM 40 MG TABLET DELAYED RELEASE (PRIOR

          AUTH: RX REF#:[id number]) ORAL DAILY

          TAKING LISINOPRIL 20 MG TABLET (PRIOR AUTH: RX REF#:[id number])

          ORAL 2 IF NEEDED IN AM

          TAKING CLOPIDOGREL BISULFATE 75 MG TABLET (PRIOR AUTH: RX

          REF#:[id number]) ORAL DAILY

          TAKING AMLODIPINE BESYLATE 5 MG TABLET (PRIOR AUTH: RX

          REF#:[id number]) ORAL DAILY

          TAKING ISOSORBIDE MONONITRATE ER 30 MG TABLET EXTENDED RELEASE 24

          HOUR (PRIOR AUTH: RX REF#:[id number]) ORAL DAILY

          TAKING SYNTHROID 100 MCG TABLET (PRIOR AUTH: RX

          REF#:[id number]) ORAL DAILY

          TAKING ACETAMINOPHEN EXTRA STRENGTH 500 MG TABLET 2 TABLETS AS

          NEEDED ORALLY EVERY 6 HRS

          TAKING ATORVASTATIN CALCIUM 20 MG TABLET (PRIOR AUTH: RX

          REF#:[id number]) ORAL DAILY

          TAKING NORCO 5-325 MG TABLET 1 TABLET AS NEEDED ORALLY TAKE 1

          DAILY IF NEEDED FOR PAIN MDD=1

          TAKING TRAMADOL HCL 50 MG TABLET 1 TABLET AS NEEDED ORALLY EVERY

          6 HRS, NOTES: MAX 6 PER DAY

          TAKING GABAPENTIN 300 MG CAPSULE 1 CAPSULE BEFORE BEDTIME ORALLY

          FOR PAIN ONCE A DAY MDD1

          NOT-TAKING IRON (FERROUS GLUCONATE) 256 (28 FE) MG TABLET ORALLY

          TAKES 65 MG DAILY AT SUPPERTIME

          DISCONTINUED VENLAFAXINE HCL ER 37.5 MG CAPSULE EXTENDED RELEASE

          24 HOUR (PRIOR AUTH: RX REF#:[id number]) ORAL DAILY IN A.M.

          MEDICATION LIST REVIEWED AND RECONCILED WITH THE PATIENT

           

PAST MEDICAL HISTORY

           

          HTN

          DM

          MI WITH HX STENT PLACEMENT

          ATYPICAL CHEST PAIN

          HYPOTHYROIDISM

          LOW BACK PAIN WITH LEFT SIDED RADICULAR PAIN FOLLOWING LUMBAR

          SURGERY

           

ALLERGIES

           

          JACOB-SELTZER: CHEST PAIN

          CHLORPHENIRAMINE MALEATE: UNSURE

          PHENYLTOLOXAMINE-ACETAMINOPHEN: UNSURE

           

SURGICAL HISTORY

           

          L4-L5 DISC FUSION 

          DISC REMOVAL LOW BACK 2009

          CHOLECYSTECTOMY 

          RIGHT SIDED HERNIA REPAIR 

          LEFT SIDED HERNIA REPAIR 1980S

          RIGHT KNEE REPLACEMENT M AY 2018

           

FAMILY HISTORY

           

          FATHER: 95 YRS, DIAGNOSED WITH HEART DISEASE

          MOTHER: 89 YRS, DIABETES

           

SOCIAL HISTORY

           

          GENERAL:

           

          TOBACCO USE

          ARE YOU A:NONSMOKER QUIT SMOKING YEARS AGO

           

           

          LATEX QUESTIONNAIRE

          LATEX ALLERGY : HAVE YOU EVER DEVELOPED ANY TYPE OF REACTION

          AFTER HANDLING LATEX PRODUCTS SUCH AS RUBBER GLOVES, CONDOMS,

          DIAPHRAGMS, BALLOONS, SOCKS, OR UNDERWEAR?NO

          LATEX ALLERGY : HAVE YOU EVER DEVELOPED ANY TYPE OF REACTION

          DURING OR AFTER DENTAL APPOINTMENT, VAGINAL/RECTAL EXAMINATION,

          SURGICAL PROCEDURE, OR ANY OTHER EXPOSURE?NO

          LATEX RISK : HAVE YOU EVER HAD ANY DIFFICULTY BREATHING OR HIVES

          AFTER EATING OR HANDLING ANY FRUITS, OR VEGETABLES; SUCH AS KIWI,

          BANANAS, STONE FRUITS, OR CHESTNUTSNO

          LATEX RISK : DO YOU HAVE A PREVIOUS PERSONAL HISTORY OF MORE THAN

          NINE SURGERIES, SPINA BIFIDA, OR REPEATED CATHERTIZATIONS? NO

          LATEX RISK : ARE YOU FREQUENTLY EXPOSED TO LATEX PRODUCTS IN YOUR

          OCCUPATION?NO

          DATE ASKED : 2019

           

           

          ALCOHOL SCREENING

          DID YOU HAVE A DRINK CONTAINING ALCOHOL IN THE PAST YEAR?YES

          HOW OFTEN DID YOU HAVE A DRINK CONTAINING ALCOHOL IN THE PAST

          YEAR?MONTHLY OR LESS (1 POINT)

          POINTS1

          INTERPRETATIONNEGATIVE

           

           

          RECREATIONAL DRUG USE

          DRUG USE?NO

           

           

          CAFFEINE

          CAFFEINE USE?YES

          HOW OFTEN AND HOW MUCH? 2 CUPS COFFEE/DAY AND AN OCC. SODA

           

           

          Anglican

          [id number] Pentecostalism NO Buddhist BELIEFS THAT WOULD IMPACT

          HEALTH CARE.

           

           

          LANGUAGE

          LANGUAGES SPOKEN:ENGLISH

           

           

          EDUCATION

          LEVEL OF EDUCATION:FINISHED HIGH SCHOOL

           

           

          LEARNING BARRIERS / SPECIAL NEEDS

          CHANGE FROM LAST VISIT?NO

          BARRIERS TO LEARNING?NO

          HEARING IMPAIRED?YES

          :HEARING AIDES

          VISION IMPAIRED?YES

          :CORRECTIVE LENSES

          COGNITIVELY IMPAIRED?NO

          READINESS TO LEARN?YES

          LEARNING PREFERENCES?NO

          LEARNING CAPABILITIES PRESENT?YES

          EMOTIONAL BARRIERS?NO

          SPECIAL DEVICES?NO

           NEEDED?NO

           

           

          DOMESTIC VIOLENCE

          DO YOU FEEL SAFE IN YOUR ENVIRONMENT?YES

           

           

          DIET: DIABETIC.

           

           

          EXERCISE: DAILY, WALKS, OUTDOORS PERSON, KEEPS BUSY WHEN HE CAN

          HEART RATE CAN DROP DOWN TO 50S AND HE WILL SLEEP ALL DAY (MD

          SAYS PROBLEM WITH BLOOD FLOW)..

           

           

          MARITAL STATUS: .

           

           

          PAIN CLINIC PFS, CLERGY, PUBLIC HEALTH REFERRALS

          PFS REFERRAL NEEDED?NO

          CLERGY REFERRAL NEEDED?NO

          PUBLIC HEALTH REFERRAL NEEDED?NO

          WAS THE PROVIDER NOTIFIED OF ANY PERTINENT INFO?NO N/A

          HAS THE PATIENT BEEN EDUCATED REGARDING HIS/HER PLAN OF CARE?YES

          HAS THE PATIENT BEEN EDUCATED REGARDING PAIN, THE RISK FOR PAIN,

          THE IMPORTANCE OF EFFECTIVE PAIN MANAGEMENT, AND THE PAIN

          ASSESSMENT PROCESS?YES

           

           

          ADVANCE DIRECTIVE

          ADVANCE DIRECTIVE DISCUSSED WITH PATIENT:YES PT STATES HE HAS HCP

          AND WILL BRING IN COPY AT NEXT VISIT. STATES COPY IS ON FILE WITH

          'S OFFICE. 3/29/19

           

           

          HAS LIVING WILL, POA , HCP ON FILE WITH HIS , WILL BRING

          COPY AT NEXT VISIT. REVEIWED WITH PT 3/29/19 BV.

           

HOSPITALIZATION/MAJOR DIAGNOSTIC PROCEDURE

           

          SEE ABOVE

          HAD CARDIAC CATH 1 WEEK AGO (AUG 17) WITH RESULTS NEGATIVE

           

REVIEW OF SYSTEMS

           

      REVIEWED BY:

           

          PROVIDER:    SIM .

           

      CONSTITUTIONAL:

           

          ANY CHANGE IN YOUR MEDICAL CONDITION?    NO . CHILLS    NO .

          FEVER    NO .

           

      INFECTION:

           

          DO YOU HAVE NEW INFECTIONS?    NO . DO YOU HAVE HISTORY OF MRSA? 

            NO .

           

      MUSCULOSKELETAL:

           

          ANY NEW PATTERNS OF PAIN OR NUMBNESS?    YES, PT STATES HE HAS

          HAD NEW AND INCREASING PAIN IN LEFT SHOULDER AND ENTIRE LEFT ARM.

          STATES HE HAS HAD THIS PAIN FOR ABOUT 3 MONTHS. STATES HE WAS

          REFERRED TO DR. MARLOW TO HAVE A NERVE CONDUCTION STUDY DONE. .

           

      GASTROENTEROLOGY:

           

          ANY NEW CHANGE IN BOWEL CONTROL?    NO .

           

      GENITOURINARY:

           

          ANY NEW CHANGE IN BLADDER CONTROL?    NO . IS THERE A CHANCE YOU

          COULD BE PREGNANT?    NO .

           

      HEMATOLOGY/LYMPH:

           

          DO YOU TAKE ANY BLOOD THINNERS? (FOR EXAMPLE- COUMADIN, PLAVIX,

          AGGRENOX, PLATEL, PRADAXA, OR XARELTO)    YES, PLAVIX . WHEN WAS

          YOUR LAST DOSE?    DATE: TIME:  .

           

      NEUROLOGY:

           

          HAVE YOU FALLEN IN THE PAST 12 MONTHS?    NO . ANY NEW EXTREMITY

          NUMBNESS OR WEAKNESS?    NO .

           

      CARDIOLOGY:

           

          DO YOU HAVE A PACEMAKER OR DEFIBRILLATOR?    NO .

           

      RESPIRATORY:

           

          HAVE YOU BEEN SICK IN THE PAST WEEK?    NO . FEVER    NO . FLU

          LIKE SYMPTOMS?    NO . COUGH    NO .

           

      INTEGUMENTARY:

           

          DO YOU HAVE ANY RASHES OR OPEN SORES?    NO .

           

      ALLERGIC/IMMUNO:

           

          ARE YOU ALLERGIC TO IV DYE?    NO . ANY NEW ALLERGIES?    NO .

           

      PSYCHIATRIC:

           

          DO YOU HAVE THOUGHTS OF HURTING YOURSELF OR SOMEONE ELSE?    NO .

          ARE YOU ABUSED, NEGLECTED, OR IN AN UNSAFE ENVIRONMENT?    NO .

           

      ENDOCRINOLOGY:

           

          ARE YOU DIABETIC?    YES, ON MEDICATION .

           

      OTHER:

           

          DO YOU NEED ANY PRESCRIPTIONS?    NO . IF YES, PLEASE LIST:   

          ____ . ANY NEW PROBLEMS WITH YOUR MEDICATIONS?    NO . WHEN DID

          YOU LAST EAT?    ____ . WHEN DID YOU LAST DRINK?    ____ . WHAT

          DID YOU LAST DRINK?    ____ . NAME OF PERSON DRIVING YOU HOME?   

          ____ . DO YOU HAVE ANY OTHER QUESTIONS OR CONCERNS    NO .

           

VITAL SIGNS

           

          .2 LBS, HT 66", BMI 32.31 INDEX, /78 MM HG, HR 75

          /MIN, RR 18 /MIN, TEMP 97.8 F, OXYGEN SAT % 96%, NA INITIALS SC

          13:53, REVIEWED BY: BV.

           

EXAMINATION

           

      GENERAL EXAMINATION:

          GENERAL APPEARANCE:NO ACUTE DISTRESS, WELL NOURISHED AND

          HYDRATED.

           

          PSYCHAPPROPRIATE MOOD AND AFFECT .

           

          NECK: NO MASSESS OR SCARS, TENDER TO PALAPTION TO LSIDED

          PARACERVICAL MUSCLES.POSITIVE SPURLING'S LEFT SIDE.

           

          LUNGS:CLEAR TO AUSCULTATION BILATERALLY, NO WHEEZES, RHONCHI,

          RALES.

           

          HEART:NO MURMURS, REGULAR RATE AND RHYTHM.

           

ASSESSMENTS

           

          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

          REGION - M51.17

           

          CERVICAL PAIN - M54.2

           

TREATMENT

           

      INTERVERTEBRAL DISC DISORDER WITH RADICULOPATHY OF

          LUMBOSACRAL REGION

          CLINICAL NOTES: CONTINUE WITH CURRENT MEDICATION. PLAN: WAIT FOR

          MRI CERVICAL SPINE AND NCS FOR LEFT ARM.

           

PROCEDURE CODES

           

           ESTABILISHED PATIENT [facility] CHARGE

           

DISPOSITION & COMMUNICATION

           

FOLLOW UP

           

          3 MONTHS

           

 

ELECTRONICALLY SIGNED BY IRMA BYERS ON

          2019 AT 08:33 AM EDT

           

           

           

 

DISCLAIMER :

THIS IS A VISIT SUMMARY EXTRACTED FROM THE [employer] CHART.

IT IS NOT A COPY OF THE [employer] PROGRESS NOTE.

RASHEED

## 2019-07-23 ENCOUNTER — HOSPITAL ENCOUNTER (OUTPATIENT)
Dept: HOSPITAL 53 - M PAIN | Age: 69
End: 2019-07-23
Attending: NURSE PRACTITIONER
Payer: MEDICARE

## 2019-07-23 DIAGNOSIS — Z79.891: ICD-10-CM

## 2019-07-23 DIAGNOSIS — Z88.8: ICD-10-CM

## 2019-07-23 DIAGNOSIS — Z95.5: ICD-10-CM

## 2019-07-23 DIAGNOSIS — I25.2: ICD-10-CM

## 2019-07-23 DIAGNOSIS — Z79.899: ICD-10-CM

## 2019-07-23 DIAGNOSIS — M51.17: Primary | ICD-10-CM

## 2019-07-23 DIAGNOSIS — Z98.1: ICD-10-CM

## 2019-07-23 DIAGNOSIS — Z96.651: ICD-10-CM

## 2019-07-23 DIAGNOSIS — I10: ICD-10-CM

## 2019-07-23 DIAGNOSIS — Z90.49: ICD-10-CM

## 2019-07-23 DIAGNOSIS — E03.9: ICD-10-CM

## 2019-07-23 DIAGNOSIS — Z87.891: ICD-10-CM

## 2019-07-23 DIAGNOSIS — Z79.84: ICD-10-CM

## 2019-07-23 DIAGNOSIS — M54.2: ICD-10-CM

## 2019-07-23 DIAGNOSIS — E11.9: ICD-10-CM

## 2019-07-25 NOTE — ECWPNPC
PATIENT NAME: DAMIR GRACE

: 1950

GENDER: MALE

MRN: [id number]

VISIT DATE: 2019

DISCHARGE DATE: 19 1417

VISIT LOCKED DATE TIME: 

PHYSICIAN: RACHAEL VELAZQUEZ 

RESOURCE: RACHAEL VELAZQUEZ 

 

           

           

REASON FOR APPOINTMENT

           

          1. NEW BODY PART, NECK PAIN

           

HISTORY OF PRESENT ILLNESS

           

      HISTORY OF PRESENT ILLNESS:

      PAIN

           

           

          THE PATIENT DESCRIBES THE PAIN...

           

           68 YEAR OLD MALE IN FOR CHRONIC PAIN FOLLOW UP. HE FEELS HIS

          PAIN HAS MOSTLY RESOLVED AND CURRENTLY RATES IT AT A 4/10. HE

          DOES ADMIT TO RECENT BOUTS OF PNEUMONIA, BRONCHITIS, AND THRUSH.

           

      FALL RISK SCREENING:

      SCREENING

           

           

          :NO FALLS REPORTED IN THE LAST YEAR

           

CURRENT MEDICATIONS

           

          TAKING METFORMIN  MG TABLET 1 TABLET WITH MEALS ORALLY BID

          TAKING METOPROLOL TARTRATE 50 MG TABLET 1 TABLET WITH FOOD ORALLY

          DAILY

          TAKING VENLAFAXINE HCL ER 75 MG CAPSULE EXTENDED RELEASE 24 HOUR

          1 CAPSULE WITH FOOD ORALLY AT BEDTIME

          TAKING NITROGLYCERIN 0.4 MG TABLET SUBLINGUAL SUBLINGUAL AS

          NEEDED

          TAKING NITROGLYCERIN 0.4 MG/HR PATCH 24 HOUR (PRIOR AUTH: RX

          REF#:[id number]) TRANSDERMAL DAILY

          TAKING PANTOPRAZOLE SODIUM 40 MG TABLET DELAYED RELEASE (PRIOR

          AUTH: RX REF#:[id number]) ORAL DAILY

          TAKING LISINOPRIL 20 MG TABLET (PRIOR AUTH: RX REF#:[id number])

          ORAL 2 IF NEEDED IN AM

          TAKING CLOPIDOGREL BISULFATE 75 MG TABLET (PRIOR AUTH: RX

          REF#:[id number]) ORAL DAILY

          TAKING AMLODIPINE BESYLATE 5 MG TABLET (PRIOR AUTH: RX

          REF#:[id number]) ORAL DAILY

          TAKING ISOSORBIDE MONONITRATE ER 30 MG TABLET EXTENDED RELEASE 24

          HOUR (PRIOR AUTH: RX REF#:[id number]) ORAL DAILY

          TAKING SYNTHROID 100 MCG TABLET (PRIOR AUTH: RX

          REF#:[id number]) ORAL DAILY

          TAKING ACETAMINOPHEN EXTRA STRENGTH 500 MG TABLET 2 TABLETS AS

          NEEDED ORALLY EVERY 6 HRS

          TAKING ATORVASTATIN CALCIUM 20 MG TABLET (PRIOR AUTH: RX

          REF#:[id number]) ORAL DAILY

          TAKING NORCO 5-325 MG TABLET 1 TABLET AS NEEDED ORALLY TAKE 1

          DAILY IF NEEDED FOR PAIN MDD=1

          TAKING TRAMADOL HCL 50 MG TABLET 1 TABLET AS NEEDED ORALLY EVERY

          6 HRS, NOTES: MAX 6 PER DAY

          TAKING GABAPENTIN 300 MG CAPSULE 1 CAPSULE BEFORE BEDTIME ORALLY

          FOR PAIN ONCE A DAY MDD1

          TAKING IRON (FERROUS GLUCONATE) 256 (28 FE) MG TABLET ORALLY

          TAKES 65 MG DAILY AT SUPPERTIME

          MEDICATION LIST REVIEWED AND RECONCILED WITH THE PATIENT

           

PAST MEDICAL HISTORY

           

          HTN

          DM

          MI WITH HX STENT PLACEMENT

          ATYPICAL CHEST PAIN

          HYPOTHYROIDISM

          LOW BACK PAIN WITH LEFT SIDED RADICULAR PAIN FOLLOWING LUMBAR

          SURGERY

           

ALLERGIES

           

          JACOB-SELTZER: CHEST PAIN

          CHLORPHENIRAMINE MALEATE: UNSURE

          PHENYLTOLOXAMINE-ACETAMINOPHEN: UNSURE

           

SURGICAL HISTORY

           

          L4-L5 DISC FUSION 

          DISC REMOVAL LOW BACK 2009

          CHOLECYSTECTOMY 

          RIGHT SIDED HERNIA REPAIR 

          LEFT SIDED HERNIA REPAIR 

          RIGHT KNEE REPLACEMENT M AY 2018

           

FAMILY HISTORY

           

          FATHER: 95 YRS, DIAGNOSED WITH HEART DISEASE

          MOTHER: 89 YRS, DIABETES

           

SOCIAL HISTORY

           

          GENERAL:

           

          TOBACCO USE

          ARE YOU A:NONSMOKER QUIT SMOKING YEARS AGO

           

           

          EDUCATION

          LEVEL OF EDUCATION:FINISHED HIGH SCHOOL

           

           

          DIET: DIABETIC.

           

           

          LANGUAGE

          LANGUAGES SPOKEN:ENGLISH

           

           

          DOMESTIC VIOLENCE

          DO YOU FEEL SAFE IN YOUR ENVIRONMENT?YES

           

           

          RECREATIONAL DRUG USE

          DRUG USE?NO

           

           

          EXERCISE: DAILY, WALKS, OUTDOORS PERSON, KEEPS BUSY WHEN HE CAN

          HEART RATE CAN DROP DOWN TO 50S AND HE WILL SLEEP ALL DAY (MD

          SAYS PROBLEM WITH BLOOD FLOW)..

           

           

          LEARNING BARRIERS / SPECIAL NEEDS

          CHANGE FROM LAST VISIT?NO

          BARRIERS TO LEARNING?NO

          HEARING IMPAIRED?YES

          :HEARING AIDES

          VISION IMPAIRED?YES

          :CORRECTIVE LENSES

          COGNITIVELY IMPAIRED?NO

          READINESS TO LEARN?YES

          LEARNING PREFERENCES?NO

          LEARNING CAPABILITIES PRESENT?YES

          EMOTIONAL BARRIERS?NO

          SPECIAL DEVICES?NO

           NEEDED?NO

           

           

          PAIN CLINIC PFS, CLERGY, PUBLIC HEALTH REFERRALS

          PFS REFERRAL NEEDED?NO

          CLERGY REFERRAL NEEDED?NO

          PUBLIC HEALTH REFERRAL NEEDED?NO

          WAS THE PROVIDER NOTIFIED OF ANY PERTINENT INFO?NO N/A

          HAS THE PATIENT BEEN EDUCATED REGARDING HIS/HER PLAN OF CARE?YES

          HAS THE PATIENT BEEN EDUCATED REGARDING PAIN, THE RISK FOR PAIN,

          THE IMPORTANCE OF EFFECTIVE PAIN MANAGEMENT, AND THE PAIN

          ASSESSMENT PROCESS?YES

           

           

          LATEX QUESTIONNAIRE

          LATEX ALLERGY : HAVE YOU EVER DEVELOPED ANY TYPE OF REACTION

          AFTER HANDLING LATEX PRODUCTS SUCH AS RUBBER GLOVES, CONDOMS,

          DIAPHRAGMS, BALLOONS, SOCKS, OR UNDERWEAR?NO

          LATEX ALLERGY : HAVE YOU EVER DEVELOPED ANY TYPE OF REACTION

          DURING OR AFTER DENTAL APPOINTMENT, VAGINAL/RECTAL EXAMINATION,

          SURGICAL PROCEDURE, OR ANY OTHER EXPOSURE?NO

          LATEX RISK : HAVE YOU EVER HAD ANY DIFFICULTY BREATHING OR HIVES

          AFTER EATING OR HANDLING ANY FRUITS, OR VEGETABLES; SUCH AS KIWI,

          BANANAS, STONE FRUITS, OR CHESTNUTSNO

          LATEX RISK : DO YOU HAVE A PREVIOUS PERSONAL HISTORY OF MORE THAN

          NINE SURGERIES, SPINA BIFIDA, OR REPEATED CATHERIZATIONS? NO

          LATEX RISK : ARE YOU FREQUENTLY EXPOSED TO LATEX PRODUCTS IN YOUR

          OCCUPATION?NO

          DATE ASKED : 2019

           

           

          CAFFEINE

          CAFFEINE USE?YES

          HOW OFTEN AND HOW MUCH? 2 CUPS COFFEE/DAY AND AN OCC. SODA

           

           

          ADVANCE DIRECTIVE

          ADVANCE DIRECTIVE DISCUSSED WITH PATIENT:YES PT STATES HE HAS HCP

          AND WILL BRING IN COPY AT NEXT VISIT. STATES COPY IS ON FILE WITH

          'S OFFICE.

           

           

          Jew

          [id number] Druze NO Restorationism BELIEFS THAT WOULD IMPACT

          HEALTH CARE.

           

           

          MARITAL STATUS: .

           

           

          ALCOHOL SCREENING

          DID YOU HAVE A DRINK CONTAINING ALCOHOL IN THE PAST YEAR?YES

          HOW OFTEN DID YOU HAVE A DRINK CONTAINING ALCOHOL IN THE PAST

          YEAR?MONTHLY OR LESS (1 POINT)

          POINTS1

          INTERPRETATIONNEGATIVE

           

           

          HAS LIVING WILL, POA , HCP ON FILE WITH HIS , WILL BRING

          COPY AT NEXT VISIT. REVEIWED WITH PT 3/29/19 BV.

           

HOSPITALIZATION/MAJOR DIAGNOSTIC PROCEDURE

           

          SEE ABOVE

          HAD CARDIAC CATH 1 WEEK AGO (AUG 17) WITH RESULTS NEGATIVE

           

REVIEW OF SYSTEMS

           

      REVIEWED BY:

           

          PROVIDER:    AFTAB VELAZQUEZ FNP-C .

           

      CONSTITUTIONAL:

           

          ANY CHANGE IN YOUR MEDICAL CONDITION?    YES, C5,6,7 PINCHED,

          WENT TO PT AND RESOLVED . CHILLS    NO . FEVER    NO .

           

      INFECTION:

           

          DO YOU HAVE NEW INFECTIONS?    NO . DO YOU HAVE HISTORY OF MRSA? 

            NO .

           

      MUSCULOSKELETAL:

           

          ANY NEW PATTERNS OF PAIN OR NUMBNESS?    NO .

           

      GASTROENTEROLOGY:

           

          ANY NEW CHANGE IN BOWEL CONTROL?    NO .

           

      GENITOURINARY:

           

          ANY NEW CHANGE IN BLADDER CONTROL?    YES, PT C/O FREQUENCY X 2

          DAYS, TOOK SAMPLE TO [facility] . IS THERE A CHANCE YOU

          COULD BE PREGNANT?    NO .

           

      HEMATOLOGY/LYMPH:

           

          DO YOU TAKE ANY BLOOD THINNERS? (FOR EXAMPLE- COUMADIN, PLAVIX,

          AGGRENOX, PLATEL, PRADAXA, OR XARELTO)    YES, PLAVIX . WHEN WAS

          YOUR LAST DOSE?    DATE: TIME:  .

           

      NEUROLOGY:

           

          HAVE YOU FALLEN IN THE PAST 12 MONTHS?    NO . ANY NEW EXTREMITY

          NUMBNESS OR WEAKNESS?    NO .

           

      CARDIOLOGY:

           

          DO YOU HAVE A PACEMAKER OR DEFIBRILLATOR?    NO .

           

      RESPIRATORY:

           

          HAVE YOU BEEN SICK IN THE PAST WEEK?    YES, PNEUMONIA AND

          BRONCHITIS TAKING PREDNISONE FOR THIS STILL COUGHING UP

          YELLOW-GREEN MUCOUS . FEVER    YES, 102 WITH PNEUMONIA RESOLVED .

          FLU LIKE SYMPTOMS?    NO . COUGH    YES, YELLOW-GREEN .

           

      INTEGUMENTARY:

           

          DO YOU HAVE ANY RASHES OR OPEN SORES?    NO .

           

      ALLERGIC/IMMUNO:

           

          ARE YOU ALLERGIC TO IV DYE?    NO . ANY NEW ALLERGIES?    NOT

          SURE STARTED ON A NEW MEDICATION, CAN'T REMEMBER NAME, DEVELOPED

          THRUSH IN MOUTH, SEEING PCP FOR THIS TOMORROW .

           

      PSYCHIATRIC:

           

          DO YOU HAVE THOUGHTS OF HURTING YOURSELF OR SOMEONE ELSE?    NO .

          ARE YOU ABUSED, NEGLECTED, OR IN AN UNSAFE ENVIRONMENT?    NO .

           

      ENDOCRINOLOGY:

           

          ARE YOU DIABETIC?    YES .

           

      OTHER:

           

          DO YOU NEED ANY PRESCRIPTIONS?    NO . IF YES, PLEASE LIST:   

          ____ . ANY NEW PROBLEMS WITH YOUR MEDICATIONS?    YES . WHEN DID

          YOU LAST EAT?    ____ . WHEN DID YOU LAST DRINK?    ____ . WHAT

          DID YOU LAST DRINK?    ____ . NAME OF PERSON DRIVING YOU HOME?   

          ____ . DO YOU HAVE ANY OTHER QUESTIONS OR CONCERNS    NO .

           

VITAL SIGNS

           

          .8 LBS, HT 66", BMI 29.99 INDEX, /75 MM HG, HR 92

          /MIN, RR 18 /MIN, TEMP 96.6 F, OXYGEN SAT % 93%, NA INITIALS AW

          1343, REVIEWED BY: TOMMIE.

           

EXAMINATION

           

      GENERAL EXAMINATION:

          GENERALNO ACUTE DISTRESS, WELL NOURISHED AND HYDRATED.

           

          PSYCHAPPROPRIATE MOOD AND AFFECT .

           

          LUNGS:CLEAR TO AUSCULTATION BILATERALLY, NO WHEEZES, RHONCHI,

          RALES.

           

          HEART:NO MURMURS, REGULAR RATE AND RHYTHM.

           

ASSESSMENTS

           

          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

           

          CERVICALGIA - M54.2

           

TREATMENT

           

      INTERVERTEBRAL DISC DISORDER WITH RADICULOPATHY OF

          LUMBOSACRAL REGION

          CLINICAL NOTES: 68 YEAR OLD MALE IN FOR CHRONIC PAIN FOLLOW UP.

          HE STATES HIS PAIN HAS RESOLVED FOR THE MOST PART. GIVEN

          PRESENTING SYMPTOMS AND RESULTS OF PHYSICAL EXAMINATION

          RECOMMENDED THAT HE CALL THE OFFICE FOR FOLLOW UP SHOULD SYMPTOMS

          RETURN. PATIENT HAS EXPRESSED UNDERSTANDING OF AND WAS IN

          AGREEMENT WITH TX PLAN. GIVEN TIME TO ASK QUESTIONS AND EXPRESS

          CONCERNS. , [employer] REGISTRY REVIEWED AND DEMONSTRATES COMPLLIANCE.

          (REF # [id number] ) BRINGS IN MEDICATIONS WHICH IS APPROPRIATE FOR

          WHAT WAS DISPENSED. RECENT URINE TOXICOLOGY REVIEWED. NO

          UNAUTHORIZED MEDICATIONS. NO ILLICIT SUBSTANCES AND PRESCRIBED

          MEDICATIONS WERE PRESENT.

           

PROCEDURE CODES

           

           ESTABILISHED PATIENT [facility] CHARGE

           

DISPOSITION & COMMUNICATION

           

 

ELECTRONICALLY SIGNED BY IRMA DECKER ON

          2019 AT 10:33 AM EDT

           

           

           

 

DISCLAIMER :

THIS IS A VISIT SUMMARY EXTRACTED FROM THE [employer] CHART.

IT IS NOT A COPY OF THE [employer] PROGRESS NOTE.

RASHEED

## 2025-06-05 NOTE — ECWPNPC
PATIENT NAME: DAMIR GRACE

: 1950

GENDER: MALE

MRN: [id number]

VISIT DATE: 2017

DISCHARGE DATE: 17 1148

VISIT LOCKED DATE TIME: 

PHYSICIAN: MEDINA PARKER  

RESOURCE: MEDINA PARKER  

 

           

           

REASON FOR APPOINTMENT

           

          1. TRANFORAMINAL EPIDURAL

           

HISTORY OF PRESENT ILLNESS

           

      HISTORY OF PRESENT ILLNESS:

      PAIN

           

           

          THE PATIENT DESCRIBES THE PAIN...

           

      FALL RISK SCREENING:

      SCREENING

           

           

          :NO FALLS IN THE PAST YEAR

           

CURRENT MEDICATIONS

           

          TAKING METFORMIN  MG TABLET 1 TABLET WITH MEALS ORALLY

          DAILY, NOTES: 17 WITH DINNER

          TAKING IRON (FERROUS GLUCONATE) 256 (28 FE) MG TABLET ORALLY

          TAKES 65 MG DAILY AT SUPPERTIME, NOTES: TAKES 65MG 17 AM

          TAKING METOPROLOL TARTRATE 50 MG TABLET 1 TABLET WITH FOOD ORALLY

          DAILY, NOTES: 17

          TAKING VENLAFAXINE HCL  MG CAPSULE EXTENDED RELEASE 24 HOUR

          1 CAPSULE WITH FOOD ORALLY AT BEDTIME, NOTES: OUT OF MED

          TAKING NITROGLYCERIN 0.4 MG TABLET SUBLINGUAL SUBLINGUAL AS

          NEEDED, NOTES: 17

          TAKING MELOXICAM 7.5 MG TABLET (PRIOR AUTH: RX REF#:[id number])

          ORAL DAILY, NOTES: 17 AM

          TAKING NITROGLYCERIN 0.4 MG/HR PATCH 24 HOUR (PRIOR AUTH: RX

          REF#:[id number]) TRANSDERMAL DAILY, NOTES: ON

          TAKING GABAPENTIN 300 MG CAPSULE (PRIOR AUTH: RX

          REF#:[id number]) ORAL DAILY (TUE AND FRIDAY), NOTES: 17 AM

          TAKING ATORVASTATIN CALCIUM 20 MG TABLET (PRIOR AUTH: RX

          REF#:[id number]) ORAL DAILY, NOTES: 17 AM

          TAKING PANTOPRAZOLE SODIUM 40 MG TABLET DELAYED RELEASE (PRIOR

          AUTH: RX REF#:[id number]) ORAL DAILY, NOTES: 17 AM

          TAKING LISINOPRIL 20 MG TABLET (PRIOR AUTH: RX REF#:[id number])

          ORAL 2 IF NEEDED IN AM, NOTES: 17 AM

          TAKING RANEXA 500 MG TABLET EXTENDED RELEASE 12 HOUR (PRIOR AUTH:

          RX REF#:[id number]) ORAL BID, NOTES: 17 AM

          TAKING TOPROL XL 50 MG TABLET EXTENDED RELEASE 24 HOUR (PRIOR

          AUTH: RX REF#:[id number]) ORAL , NOTES: 17 AM

          TAKING CLOPIDOGREL BISULFATE 75 MG TABLET (PRIOR AUTH: RX

          REF#:[id number]) ORAL DAILY, NOTES: 17 0800

          TAKING ESOMEPRAZOLE MAGNESIUM 40 MG CAPSULE DELAYED RELEASE

          (PRIOR AUTH: RX REF#:[id number]) ORAL , NOTES: 17 AM

          TAKING AMLODIPINE BESYLATE 5 MG TABLET (PRIOR AUTH: RX

          REF#:[id number]) ORAL DAILY, NOTES: 17 AM

          TAKING ISOSORBIDE MONONITRATE ER 30 MG TABLET EXTENDED RELEASE 24

          HOUR (PRIOR AUTH: RX REF#:[id number]) ORAL DAILY, NOTES:

          17

          TAKING SYNTHROID 100 MCG TABLET (PRIOR AUTH: RX

          REF#:[id number]) ORAL DAILY, NOTES: 17 AM

          TAKING VENLAFAXINE HCL ER 37.5 MG CAPSULE EXTENDED RELEASE 24

          HOUR (PRIOR AUTH: RX REF#:[id number]) ORAL DAILY, NOTES:

          17 AM

          MEDICATION LIST REVIEWED AND RECONCILED WITH THE PATIENT

           

PAST MEDICAL HISTORY

           

          HTN

          DM

          MI WITH HX STENT PLACEMENT

          ATYPICAL CHEST PAIN

          HYPOTHYROIDISM

          LOW BACK PAIN WITH LEFT SIDED RADICULAR PAIN FOLLOWING LUMBAR

          SURGERY

           

ALLERGIES

           

          JACOB-SELTZER: CHEST PAIN

          CHLORPHENIRAMINE MALEATE: UNSURE

          PHENYLTOLOXAMINE-ACETAMINOPHEN: UNSURE

           

SOCIAL HISTORY

           

          GENERAL:

           

          TOBACCO USE

          ARE YOU A:NONSMOKER QUIT SMOKING YEARS AGO

           

           

          ALCOHOL SCREENING

          DID YOU HAVE A DRINK CONTAINING ALCOHOL IN THE PAST YEAR?YES

          POINTS1

          INTERPRETATIONNEGATIVE

          HOW OFTEN DID YOU HAVE A DRINK CONTAINING ALCOHOL IN THE PAST

          YEAR?MONTHLY OR LESS (1 POINT)

           

           

          CAFFEINE

          CAFFEINE USE?YES

           

           

          DIET: DIABETIC.

           

           

          EXERCISE: DAILY, WALKS, OUTDOORS PERSON, KEEPS BUSY WHEN HE CAN

          HEART RATE CAN DROP DOWN TO 50S AND HE WILL SLEEP ALL DAY (MD

          SAYS PROBLEM WITH BLOOD FLOW)..

           

           

          MARITAL STATUS: .

           

           

          Taoism

          [id number] Yazidism NO Yarsanism BELIEFS THAT WOULD IMPACT

          HEALTH CARE.

           

           

          LANGUAGE

          LANGUAGES SPOKEN:ENGLISH

           

           

          LEARNING BARRIERS / SPECIAL NEEDS

          :CORRECTIVE LENSES

          VISION IMPAIRED?YES

          READINESS TO LEARN?YES

          LEARNING CAPABILITIES PRESENT?YES

           NEEDED?NO

          :HEARING AIDES

          HEARING IMPAIRED?YES

          LEARNING PREFERENCES?NO

          SPECIAL DEVICES?NO

          COGNITIVELY IMPAIRED?NO

           

           

          PAIN CLINIC PFS, CLERGY, PUBLIC HEALTH REFERRALS

          PFS REFERRAL NEEDED?NO

          CLERGY REFERRAL NEEDED?NO

          PUBLIC HEALTH REFERRAL NEEDED?NO

          WAS THE PROVIDER NOTIFIED OF ANY PERTINENT INFO?NO

          HAS THE PATIENT BEEN EDUCATED REGARDING HIS/HER PLAN OF CARE?YES

          HAS THE PATIENT BEEN EDUCATED REGARDING PAIN, THE RISK FOR PAIN,

          THE IMPORTANCE OF EFFECTIVE PAIN MANAGEMENT, AND THE PAIN

          ASSESSMENT PROCESS?YES

           

           

          PATIENT: ____.

           

           

          ADVANCE DIRECTIVES

          DO YOU HAVE A COPY WITH YOU?NO

          NAME OF POA? TAYLOR [phone]

          HEALTH CARE PROXY?YES

          LIVING WILL?YES

          WOULD YOU LIKE MORE INFORMATION?NO

          HAVE YOU HAD A COPY OF ANY ADVANCED DIRECTIVE (LISTED ABOVE) ON A

          PREVIOUS MEDICAL RECORDS AT [facility]?NO

          POWER OF ?YES

          NAME OF HCP DAMIR GRACE (SON)PHONE # [phone]

          DO YOU HAVE A DNR?NO

           

REVIEW OF SYSTEMS

           

      REVIEWED BY:

           

          PROVIDER:    _____ .

           

      CONSTITUTIONAL:

           

          ANY CHANGE IN YOUR MEDICAL CONDITION?    NO . CHILLS    NO .

          FEVER    NO .

           

      INFECTION:

           

          DO YOU HAVE NEW INFECTIONS?    NO . DO YOU HAVE HISTORY OF MRSA? 

            NO .

           

      MUSCULOSKELETAL:

           

          ANY NEW PATTERNS OF PAIN OR NUMBNESS?    NO .

           

      GASTROENTEROLOGY:

           

          ANY NEW CHANGE IN BOWEL CONTROL?    NO .

           

      GENITOURINARY:

           

          ANY NEW CHANGE IN BLADDER CONTROL?    NO . IS THERE A CHANCE YOU

          COULD BE PREGNANT?    NO .

           

      HEMATOLOGY/LYMPH:

           

          DO YOU TAKE ANY BLOOD THINNERS? (FOR EXAMPLE- COUMADIN, PLAVIX,

          AGGRENOX, PLATEL, PRADAXA, OR XARELTO)    YES, PLAVIX . WHEN WAS

          YOUR LAST DOSE?    DATE: TIME: 17 0800 .

           

      NEUROLOGY:

           

          HAVE YOU FALLEN IN THE PAST 6 MONTHS?    NO . ANY NEW EXTREMITY

          NUMBNESS OR WEAKNESS?    NO .

           

      CARDIOLOGY:

           

          DO YOU HAVE A PACEMAKER OR DEFIBRILLATOR?    NO .

           

      RESPIRATORY:

           

          HAVE YOU BEEN SICK IN THE PAST WEEK?    NO . FEVER    NO . FLU

          LIKE SYMPTOMS?    NO . COUGH    NO .

           

      INTEGUMENTARY:

           

          DO YOU HAVE ANY RASHES OR OPEN SORES?    NO .

           

      ALLERGIC/IMMUNO:

           

          ARE YOU ALLERGIC TO SHELLFISH OR IV DYE?    NO . ANY NEW

          ALLERGIES?    NO .

           

      PSYCHIATRIC:

           

          DO YOU HAVE THOUGHTS OF HURTING YOURSELF OR SOMEONE ELSE?    NO .

          ARE YOU ABUSED, NEGLECTED, OR IN AN UNSAFE ENVIRONMENT?    NO .

           

      ENDOCRINOLOGY:

           

          ARE YOU DIABETIC?    YES .

           

      OTHER:

           

          DO YOU NEED ANY PRESCRIPTIONS?    NO . IF YES, PLEASE LIST:   

          ____ . ANY NEW PROBLEMS WITH YOUR MEDICATIONS?    NO . WHEN DID

          YOU LAST EAT?    17 10:30 PM . WHEN DID YOU LAST DRINK?   

          17 11 PM . WHAT DID YOU LAST DRINK?    WATER . NAME OF

          PERSON DRIVING YOU HOME?    [place] . DO YOU HAVE ANY OTHER

          QUESTIONS OR CONCERNS    NO .

           

VITAL SIGNS

           

          .8 LBS, HT 5 FT 6 IN, BMI 32.08 INDEX, /78 MM HG, HR

          56 /MIN, RR 18 /MIN, TEMP 97.5 F, OXYGEN SAT % 96, NA INITIALS MP

          0851, REVIEWED BY: CM.

           

ASSESSMENTS

           

          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

           

PROCEDURES

           

      PN CAUDAL EPIDURALS

          PRE PROCEDURE DIAGNOSIS    LUMBAR POST LAMINECTOMY PAIN SYNDROME

          POST PROCEDURE DIAGNOSIS    LUMBAR POST LAMINECTOMY PAIN SYNDROME

          PROCEDURE    CAUDAL EPIDURAL STEROID INJECTION UNDER FLUOROSCOPIC

          GUIDANCE

          SURGEON    DR. MEDINA PARKER

          ASSISTANT    NONE

          ANESTHESIA    LOCAL

          PRE PROCEDURE NOTE    THE PATIENT HAS HISTORY OF CHRONIC LOW BACK

          PAIN. I EVALUATE THE PATIENT AND REVIEWED THE CHART. I WENT OVER

          THE RISKS, ALTERNATIVES, AND BENEFITS ASSOCIATED WITH THIS

          PROCEDURE. THE PATIENT WOULD LIKE TO PROCEED AND GIVE CONSENT TO

          PERFORMED THE PROCEDURE. THE PATIENT DENIES UNEXPLAINABLE WEIGHT

          LOSS, FEVER, CHILLS, OR NEW CHANGES IN URINARY OR BOWEL CONTROL.

          DESCRIPTION OF PROCEDURE    THE PATIENT WAS BROUGHT TO THE

          PROCEDURE ROOM AND PLACED IN THE PRONE POSITION. THE LUMBOSACRAL

          AREA WAS CLEANED WITH BETADINE SOLUTION AND DRAPED ASEPTICALLY.

          THE PROCEDURE WAS DONE UNDER STERILE CONDITIONS. I CHECKED

          LATERALITY AND THE LEVEL WHERE THE PROCEDURE WAS GOING TO BE

          PERFORMED WITH THE PATIENT AND THE SUPPORTING STAFF AT THE MOMENT

          OF THE TIME OUT IN THE PROCEDURE ROOM. UNDER FLUOROSCOPIC

          GUIDANCE, THE TARGET POINT WAS SELECTED AT THE EPIDURAL SPACE

          BELOW THE SACROCOCCYGEAL LIGAMENT. LIDOCAINE 0.5% WAS USE TO NUMB

          THE SKIN AND THE SUBCUTANEOUS TISSUE BELOW IT. AN EPIDURAL TUOHY

          NEEDLE, 17-GAUGE, WAS ADVANCED UNDER FLUOROSCOPIC GUIDANCE AND

          FOLLOWING PATIENT FEEDBACK UNTIL THE EPIDURAL SPACE WAS REACHED 6

          CM DEEP INTO THE SKIN BY THE LOSS OF RESISTANCE TECHNIQUE. ISOVUE

          M DYE 30%, 0.25 ML, WAS INJECTED SHOWING ADEQUATE SPREAD OF THE

          DYE. THEN, A SOLUTION OF 6 ML OF NORMAL SALINE WITH DEPO-MEDROL

          60 MG WAS INJECTED SLOWLY FOLLOWING THE PATIENT FEEDBACK. THERE

          WAS NO EVIDENCE OF BLOOD, PARESTHESIA OR CEREBROSPINAL FLUID

          DURING THE PROCEDURE. THE PATIENT WAS SENT TO THE RECOVERY ROOM.

          THE PATIENT WAS MOVING THE EXTREMITIES AND DOING WELL. THERE WAS

          NO COMPLICATION DURING THE PROCEDURE. FLUOROSCOPY TIME WAS 15

          SECONDS

          POST PROCEDURE NOTE    THE PATIENT WILL BE SEEN IN A FOLLOW UP IN

          THE NEXT FEW WEEKS. INSTRUCTIONS WERE GIVEN, QUESTIONS WERE

          ANSWERED, AND THE PATIENT EXPRESSED UNDERSTANDING AND AGREES WITH

          THE PLAN. I, BRISSA MANDUJANO, DOCUMENTED THE ABOVE INFORMATION

          ACTING AS A SCRIBE FOR DR. PARKER. I HAVE REVIEWED THE ABOVE

          DOCUMENT, WRITTEN BY BRISSA BREWER AND I VERIFY THAT IT

          IS ACCURATE.

           

DIAGNOSTIC IMAGING

           

          [facility] FLUORO GUIDE SPINE INJECTION (PAIN)[id number]

           

PROCEDURE CODES

           

          83896 LUMBAR/SACRAL W/ IMAGING

           

          6045F RADXPS IN END [id number]

           

DISPOSITION & COMMUNICATION

           

FOLLOW UP

           

          3 WEEKS

           

 

ELECTRONICALLY SIGNED BY MEDINA PARKER MD ON

          10/02/2017 AT 12:49 PM EDT

           

           

           

 

DISCLAIMER :

THIS IS A VISIT SUMMARY EXTRACTED FROM THE [employer] CHART.

IT IS NOT A COPY OF THE [employer] PROGRESS NOTE.

MTDD
No